# Patient Record
Sex: MALE | Race: WHITE | NOT HISPANIC OR LATINO | Employment: FULL TIME
[De-identification: names, ages, dates, MRNs, and addresses within clinical notes are randomized per-mention and may not be internally consistent; named-entity substitution may affect disease eponyms.]

---

## 2019-10-03 ENCOUNTER — HEALTH MAINTENANCE LETTER (OUTPATIENT)
Age: 49
End: 2019-10-03

## 2020-12-11 ENCOUNTER — APPOINTMENT (OUTPATIENT)
Dept: URBAN - METROPOLITAN AREA CLINIC 253 | Age: 50
Setting detail: DERMATOLOGY
End: 2020-12-12

## 2020-12-11 VITALS — RESPIRATION RATE: 14 BRPM | HEIGHT: 71 IN | WEIGHT: 315 LBS

## 2020-12-11 DIAGNOSIS — L70.0 ACNE VULGARIS: ICD-10-CM

## 2020-12-11 DIAGNOSIS — L98.8 OTHER SPECIFIED DISORDERS OF THE SKIN AND SUBCUTANEOUS TISSUE: ICD-10-CM

## 2020-12-11 DIAGNOSIS — L71.8 OTHER ROSACEA: ICD-10-CM

## 2020-12-11 DIAGNOSIS — L91.8 OTHER HYPERTROPHIC DISORDERS OF THE SKIN: ICD-10-CM

## 2020-12-11 PROCEDURE — OTHER COUNSELING: OTHER

## 2020-12-11 PROCEDURE — 99202 OFFICE O/P NEW SF 15 MIN: CPT

## 2020-12-11 PROCEDURE — OTHER PATIENT SPECIFIC COUNSELING: OTHER

## 2020-12-11 PROCEDURE — OTHER ADDITIONAL NOTES: OTHER

## 2020-12-11 PROCEDURE — OTHER PRESCRIPTION: OTHER

## 2020-12-11 RX ORDER — UREA 20 %
20% CREAM (GRAM) TOPICAL QD-BID
Qty: 1 | Refills: 3 | Status: ERX | COMMUNITY
Start: 2020-12-11

## 2020-12-11 RX ORDER — CLINDAMYCIN PHOSPHATE 10 MG/ML
1% LOTION TOPICAL BID
Qty: 1 | Refills: 3 | Status: ERX | COMMUNITY
Start: 2020-12-11

## 2020-12-11 ASSESSMENT — LOCATION DETAILED DESCRIPTION DERM
LOCATION DETAILED: STERNUM
LOCATION DETAILED: LEFT PROXIMAL PRETIBIAL REGION
LOCATION DETAILED: LEFT AXILLARY VAULT
LOCATION DETAILED: LEFT LATERAL PLANTAR HEEL
LOCATION DETAILED: RIGHT AXILLARY VAULT
LOCATION DETAILED: NASAL SUPRATIP
LOCATION DETAILED: RIGHT PROXIMAL PRETIBIAL REGION
LOCATION DETAILED: RIGHT MEDIAL PLANTAR HEEL

## 2020-12-11 ASSESSMENT — LOCATION SIMPLE DESCRIPTION DERM
LOCATION SIMPLE: RIGHT PRETIBIAL REGION
LOCATION SIMPLE: RIGHT PLANTAR SURFACE
LOCATION SIMPLE: CHEST
LOCATION SIMPLE: NOSE
LOCATION SIMPLE: LEFT AXILLARY VAULT
LOCATION SIMPLE: LEFT PRETIBIAL REGION
LOCATION SIMPLE: RIGHT AXILLARY VAULT
LOCATION SIMPLE: LEFT PLANTAR SURFACE

## 2020-12-11 ASSESSMENT — LOCATION ZONE DERM
LOCATION ZONE: LEG
LOCATION ZONE: AXILLAE
LOCATION ZONE: NOSE
LOCATION ZONE: TRUNK
LOCATION ZONE: FEET

## 2020-12-11 NOTE — PROCEDURE: COUNSELING
Doxycycline Counseling:  Patient counseled regarding possible photosensitivity and increased risk for sunburn.  Patient instructed to avoid sunlight, if possible.  When exposed to sunlight, patients should wear protective clothing, sunglasses, and sunscreen.  The patient was instructed to call the office immediately if the following severe adverse effects occur:  hearing changes, easy bruising/bleeding, severe headache, or vision changes.  The patient verbalized understanding of the proper use and possible adverse effects of doxycycline.  All of the patient's questions and concerns were addressed.
High Dose Vitamin A Pregnancy And Lactation Text: High dose vitamin A therapy is contraindicated during pregnancy and breast feeding.
Spironolactone Pregnancy And Lactation Text: This medication can cause feminization of the male fetus and should be avoided during pregnancy. The active metabolite is also found in breast milk.
Birth Control Pills Counseling: Birth Control Pill Counseling: I discussed with the patient the potential side effects of OCPs including but not limited to increased risk of stroke, heart attack, thrombophlebitis, deep venous thrombosis, hepatic adenomas, breast changes, GI upset, headaches, and depression.  The patient verbalized understanding of the proper use and possible adverse effects of OCPs. All of the patient's questions and concerns were addressed.
Tazorac Pregnancy And Lactation Text: This medication is not safe during pregnancy. It is unknown if this medication is excreted in breast milk.
Erythromycin Counseling:  I discussed with the patient the risks of erythromycin including but not limited to GI upset, allergic reaction, drug rash, diarrhea, increase in liver enzymes, and yeast infections.
Include Pregnancy/Lactation Warning?: No
Detail Level: Simple
Azithromycin Counseling:  I discussed with the patient the risks of azithromycin including but not limited to GI upset, allergic reaction, drug rash, diarrhea, and yeast infections.
High Dose Vitamin A Counseling: Side effects reviewed, pt to contact office should one occur.
Spironolactone Counseling: Patient advised regarding risks of diarrhea, abdominal pain, hyperkalemia, birth defects (for female patients), liver toxicity and renal toxicity. The patient may need blood work to monitor liver and kidney function and potassium levels while on therapy. The patient verbalized understanding of the proper use and possible adverse effects of spironolactone.  All of the patient's questions and concerns were addressed.
Tazorac Counseling:  Patient advised that medication is irritating and drying.  Patient may need to apply sparingly and wash off after an hour before eventually leaving it on overnight.  The patient verbalized understanding of the proper use and possible adverse effects of tazorac.  All of the patient's questions and concerns were addressed.
Dapsone Pregnancy And Lactation Text: This medication is Pregnancy Category C and is not considered safe during pregnancy or breast feeding.
Topical Clindamycin Pregnancy And Lactation Text: This medication is Pregnancy Category B and is considered safe during pregnancy. It is unknown if it is excreted in breast milk.
Sarecycline Counseling: Patient advised regarding possible photosensitivity and discoloration of the teeth, skin, lips, tongue and gums.  Patient instructed to avoid sunlight, if possible.  When exposed to sunlight, patients should wear protective clothing, sunglasses, and sunscreen.  The patient was instructed to call the office immediately if the following severe adverse effects occur:  hearing changes, easy bruising/bleeding, severe headache, or vision changes.  The patient verbalized understanding of the proper use and possible adverse effects of sarecycline.  All of the patient's questions and concerns were addressed.
Bactrim Pregnancy And Lactation Text: This medication is Pregnancy Category D and is known to cause fetal risk.  It is also excreted in breast milk.
Erythromycin Pregnancy And Lactation Text: This medication is Pregnancy Category B and is considered safe during pregnancy. It is also excreted in breast milk.
Benzoyl Peroxide Counseling: Patient counseled that medicine may cause skin irritation and bleach clothing.  In the event of skin irritation, the patient was advised to reduce the amount of the drug applied or use it less frequently.   The patient verbalized understanding of the proper use and possible adverse effects of benzoyl peroxide.  All of the patient's questions and concerns were addressed.
Isotretinoin Counseling: Patient should get monthly blood tests, not donate blood, not drive at night if vision affected, not share medication, and not undergo elective surgery for 6 months after tx completed. Side effects reviewed, pt to contact office should one occur.
Topical Retinoid Pregnancy And Lactation Text: This medication is Pregnancy Category C. It is unknown if this medication is excreted in breast milk.
Birth Control Pills Pregnancy And Lactation Text: This medication should be avoided if pregnant and for the first 30 days post-partum.
Minocycline Pregnancy And Lactation Text: This medication is Pregnancy Category D and not consider safe during pregnancy. It is also excreted in breast milk.
Bactrim Counseling:  I discussed with the patient the risks of sulfa antibiotics including but not limited to GI upset, allergic reaction, drug rash, diarrhea, dizziness, photosensitivity, and yeast infections.  Rarely, more serious reactions can occur including but not limited to aplastic anemia, agranulocytosis, methemoglobinemia, blood dyscrasias, liver or kidney failure, lung infiltrates or desquamative/blistering drug rashes.
Dapsone Counseling: I discussed with the patient the risks of dapsone including but not limited to hemolytic anemia, agranulocytosis, rashes, methemoglobinemia, kidney failure, peripheral neuropathy, headaches, GI upset, and liver toxicity.  Patients who start dapsone require monitoring including baseline LFTs and weekly CBCs for the first month, then every month thereafter.  The patient verbalized understanding of the proper use and possible adverse effects of dapsone.  All of the patient's questions and concerns were addressed.
Topical Retinoid counseling:  Patient advised to apply a pea-sized amount only at bedtime and wait 30 minutes after washing their face before applying.  If too drying, patient may add a non-comedogenic moisturizer. The patient verbalized understanding of the proper use and possible adverse effects of retinoids.  All of the patient's questions and concerns were addressed.
Minocycline Counseling: Patient advised regarding possible photosensitivity and discoloration of the teeth, skin, lips, tongue and gums.  Patient instructed to avoid sunlight, if possible.  When exposed to sunlight, patients should wear protective clothing, sunglasses, and sunscreen.  The patient was instructed to call the office immediately if the following severe adverse effects occur:  hearing changes, easy bruising/bleeding, severe headache, or vision changes.  The patient verbalized understanding of the proper use and possible adverse effects of minocycline.  All of the patient's questions and concerns were addressed.
Isotretinoin Pregnancy And Lactation Text: This medication is Pregnancy Category X and is considered extremely dangerous during pregnancy. It is unknown if it is excreted in breast milk.
Benzoyl Peroxide Pregnancy And Lactation Text: This medication is Pregnancy Category C. It is unknown if benzoyl peroxide is excreted in breast milk.
Topical Sulfur Applications Counseling: Topical Sulfur Counseling: Patient counseled that this medication may cause skin irritation or allergic reactions.  In the event of skin irritation, the patient was advised to reduce the amount of the drug applied or use it less frequently.   The patient verbalized understanding of the proper use and possible adverse effects of topical sulfur application.  All of the patient's questions and concerns were addressed.
Topical Sulfur Applications Pregnancy And Lactation Text: This medication is Pregnancy Category C and has an unknown safety profile during pregnancy. It is unknown if this topical medication is excreted in breast milk.
Tetracycline Counseling: Patient counseled regarding possible photosensitivity and increased risk for sunburn.  Patient instructed to avoid sunlight, if possible.  When exposed to sunlight, patients should wear protective clothing, sunglasses, and sunscreen.  The patient was instructed to call the office immediately if the following severe adverse effects occur:  hearing changes, easy bruising/bleeding, severe headache, or vision changes.  The patient verbalized understanding of the proper use and possible adverse effects of tetracycline.  All of the patient's questions and concerns were addressed. Patient understands to avoid pregnancy while on therapy due to potential birth defects.
Doxycycline Pregnancy And Lactation Text: This medication is Pregnancy Category D and not consider safe during pregnancy. It is also excreted in breast milk but is considered safe for shorter treatment courses.
Azithromycin Pregnancy And Lactation Text: This medication is considered safe during pregnancy and is also secreted in breast milk.
Detail Level: Zone
Topical Clindamycin Counseling: Patient counseled that this medication may cause skin irritation or allergic reactions.  In the event of skin irritation, the patient was advised to reduce the amount of the drug applied or use it less frequently.   The patient verbalized understanding of the proper use and possible adverse effects of clindamycin.  All of the patient's questions and concerns were addressed.

## 2020-12-11 NOTE — HPI: RASH
What Type Of Note Output Would You Prefer (Optional)?: Standard Output
Is The Patient Presenting As Previously Scheduled?: Yes
How Severe Is Your Rash?: severe
Is This A New Presentation, Or A Follow-Up?: Rash
Additional History: He has tried clotrimazole- betamethasone. He also tried Vaseline. He usually goes barefoot.
How Severe Is Your Rash?: moderate

## 2020-12-11 NOTE — PROCEDURE: ADDITIONAL NOTES
Additional Notes: Recommend washing with an OTC BPO 4% wash
Detail Level: Detailed
Additional Notes: Discussed removal would be covered by insurance if irritated or bothersome. If not irritated, discussed cosmetic removal of up to 14 for $100.

## 2020-12-11 NOTE — PROCEDURE: PATIENT SPECIFIC COUNSELING
Sending over prescription of urea cream for patient to use on thickened areas once to twice daily. Also recommended Eucerin urea cream and provided patient with coupon. If prescription urea cream is too strong, recommended alternating between the two
Detail Level: Simple

## 2021-01-15 ENCOUNTER — HEALTH MAINTENANCE LETTER (OUTPATIENT)
Age: 51
End: 2021-01-15

## 2021-06-28 ENCOUNTER — APPOINTMENT (OUTPATIENT)
Dept: URBAN - METROPOLITAN AREA CLINIC 253 | Age: 51
Setting detail: DERMATOLOGY
End: 2021-06-30

## 2021-06-28 VITALS — WEIGHT: 312 LBS | RESPIRATION RATE: 14 BRPM | HEIGHT: 70 IN

## 2021-06-28 DIAGNOSIS — R21 RASH AND OTHER NONSPECIFIC SKIN ERUPTION: ICD-10-CM

## 2021-06-28 DIAGNOSIS — L60.1 ONYCHOLYSIS: ICD-10-CM

## 2021-06-28 PROCEDURE — OTHER ADDITIONAL NOTES: OTHER

## 2021-06-28 PROCEDURE — OTHER COUNSELING: OTHER

## 2021-06-28 PROCEDURE — OTHER PRESCRIPTION: OTHER

## 2021-06-28 PROCEDURE — 99213 OFFICE O/P EST LOW 20 MIN: CPT

## 2021-06-28 RX ORDER — TRIAMCINOLONE ACETONIDE 1 MG/G
0.1% CREAM TOPICAL BID
Qty: 1 | Refills: 1 | Status: ERX | COMMUNITY
Start: 2021-06-28

## 2021-06-28 ASSESSMENT — LOCATION DETAILED DESCRIPTION DERM
LOCATION DETAILED: SUPERIOR THORACIC SPINE
LOCATION DETAILED: LEFT 2ND TOENAIL

## 2021-06-28 ASSESSMENT — LOCATION SIMPLE DESCRIPTION DERM
LOCATION SIMPLE: UPPER BACK
LOCATION SIMPLE: LEFT 2ND TOE

## 2021-06-28 ASSESSMENT — LOCATION ZONE DERM
LOCATION ZONE: TOENAIL
LOCATION ZONE: TRUNK

## 2021-06-28 NOTE — HPI: NAIL DYSTROPHY
How Severe Is It?: moderate
Is This A New Presentation, Or A Follow-Up?: Nail Dystrophy
Additional History: He has had his foot stepped on when he played football back from 6th grade through high school and one year in college. \\nIt has been consistent with what it looked like since it was originally damaged.

## 2021-06-28 NOTE — PROCEDURE: ADDITIONAL NOTES
Render Risk Assessment In Note?: no
Additional Notes: Recommended trialing Zyrtec daily for 1 month and see if any improvement. Also recommended he take a photo next time it flares\\nDiscussed following up with PCP if not improved to discuss trial off of topamax
Detail Level: Detailed
Additional Notes: Photo taken. Recommended keeping nail soft with oil or Vaseline.

## 2021-06-28 NOTE — HPI: RASH
What Type Of Note Output Would You Prefer (Optional)?: Standard Output
Is The Patient Presenting As Previously Scheduled?: Yes
How Severe Is Your Rash?: moderate
Is This A New Presentation, Or A Follow-Up?: Rash
Additional History: He doesn’t remember this happening years duncan. The only medication that is different -topomax.  He has been on Topomax since November.

## 2021-08-30 ENCOUNTER — APPOINTMENT (OUTPATIENT)
Dept: URBAN - METROPOLITAN AREA CLINIC 253 | Age: 51
Setting detail: DERMATOLOGY
End: 2021-08-30

## 2021-08-30 DIAGNOSIS — R20.2 PARESTHESIA OF SKIN: ICD-10-CM

## 2021-08-30 PROCEDURE — 99212 OFFICE O/P EST SF 10 MIN: CPT

## 2021-08-30 PROCEDURE — OTHER COUNSELING: OTHER

## 2021-08-30 ASSESSMENT — LOCATION SIMPLE DESCRIPTION DERM
LOCATION SIMPLE: RIGHT UPPER BACK
LOCATION SIMPLE: UPPER BACK

## 2021-08-30 ASSESSMENT — LOCATION ZONE DERM: LOCATION ZONE: TRUNK

## 2021-08-30 ASSESSMENT — LOCATION DETAILED DESCRIPTION DERM
LOCATION DETAILED: SUPERIOR THORACIC SPINE
LOCATION DETAILED: RIGHT SUPERIOR MEDIAL UPPER BACK

## 2021-08-30 NOTE — PROCEDURE: COUNSELING
Patient Specific Counseling (Will Not Stick From Patient To Patient): He states that it is itchy even when he is doing dishes and painting. He has been taking a daily Benadryl with little improvement. \\nHe states that it start in the upper back and extends down to his hip and up to his neck. Reassured him that there is no active rash on exam. \\nRecommended stretching and massaging of the muscles.\\nDiscussed seeing an orthopedic or chiropractor. \\nRecommended that he continue with the [facility] cream for pruritic areas and icing the active itchy areas. \\nRecommended he discontinue the Benadryl as he is not seeing any relief with it.
Detail Level: Zone

## 2021-09-05 ENCOUNTER — HEALTH MAINTENANCE LETTER (OUTPATIENT)
Age: 51
End: 2021-09-05

## 2021-10-22 ENCOUNTER — THERAPY VISIT (OUTPATIENT)
Dept: PHYSICAL THERAPY | Facility: CLINIC | Age: 51
End: 2021-10-22
Payer: COMMERCIAL

## 2021-10-22 DIAGNOSIS — M50.30 DDD (DEGENERATIVE DISC DISEASE), CERVICAL: ICD-10-CM

## 2021-10-22 DIAGNOSIS — M54.2 NECK PAIN: ICD-10-CM

## 2021-10-22 PROCEDURE — 97140 MANUAL THERAPY 1/> REGIONS: CPT | Mod: GP | Performed by: PHYSICAL THERAPIST

## 2021-10-22 PROCEDURE — 97161 PT EVAL LOW COMPLEX 20 MIN: CPT | Mod: GP | Performed by: PHYSICAL THERAPIST

## 2021-10-22 PROCEDURE — 97110 THERAPEUTIC EXERCISES: CPT | Mod: GP | Performed by: PHYSICAL THERAPIST

## 2021-10-22 NOTE — LETTER
[facility]  [address]  [facility] [address]  [phone]    2021    Re: Sarath Mckeon   :   1970  MRN:  [id number]   REFERRING PHYSICIAN:   Sarath MCKINNON [facility]  Date of Initial Evaluation:  10/22/21  Visits:  Rxs Used: 1  Reason for Referral:  Neck pain; DDD (degenerative disc disease), cervical    EVALUATION SUMMARY    Physical Therapy Initial Evaluation  Subjective:  The history is provided by the patient. No  was used.   Patient Health History  Sarath Mckeon being seen for Neck pain, C5-6-7, notalgia paresthetica.     Problem began: 10/11/2021.   Problem occurred: unknown   Pain is reported as 5/10 on pain scale.  General health as reported by patient is good.  Pertinent medical history includes: concussions/dizziness, depression, heart problems, history of fractures, mental illness, numbness/tingling, overweight, sleep disorder/apnea, smoking, thyroid problems and other. Other medical history details: benign  bone tumor, ACL surgery.   Surgeries include:  Orthopedic surgery and cancer surgery. Other surgery history details: bone tumor, benign, ACL.    Current medications:  Anti-depressants.    Current occupation  software.   Primary job tasks include:  Computer work and prolonged sitting.              Therapist Generated HPI Evaluation  Problem details: Patient reports a long history of neck pain with x rays done in 2013 for right neck pain which showed degenerative changes at C5-6. He had some PT at [facility] at that time and has had some chiropractic treatment over the years with manipulation, most recently 6 months ago. He has been working from home since COVID on a laptop which he has to the left of his personal computer.  He notes an increase in bilateral neck pain in the spring.  Pain starts after being up for  1 hour and continues through the day. He has occipital headaches 2 days per week starting the in afternoon and they tend to be more on work days. A MRI on 21 showed moderate DDD at C5-6 with disc osteophyte complex and uncinate spurring greater on the right and facet arthropathy bilaterally with moderate to moderately severe neural foraminal stenosis R > L.  Shallow central disc protrusion C6-7 with uncinate spurring L > R. Chief complaints are of bilateral neck pain and stiffness without radiation into his arm.  He has itching of the skin on his neck, known as notalgia paresthetica and occipital headaches twice per week. He sleeps on his back with 1 pillow under his head and is painfree with sleeping. He walks 1 mile per day for exercise. He uses heat, ice and Tylenol/Advil without relief..         Type of problem:  Cervical spine.  This is a chronic condition.  Condition occurred with:  Degenerative joint disease.  Where condition occurred: for unknown reasons.  Patient reports pain:  Cervical left side and cervical right side.  Re: Sarath Mckeon   :   1970          Pain is described as aching and sharp and is intermittent.  Pain radiates to:  Head. Pain is worse during the day.  Since onset symptoms are gradually worsening.  Associated symptoms:  Loss of motion/stiffness and headache (he has vertigo). Symptoms are exacerbated by driving, looking up or down, rotating head, certain positions and sitting    Previous treatment includes chiropractic and physical therapy. There was mild improvement following previous treatment.  Restrictions due to condition include:  Working in normal job without restrictions.                 Objective:  Standing Alignment:    Cervical/Thoracic:  Forward head  Shoulder/UE:  Rounded shoulders, protracted scapula L, protracted scapula R, elevated scapula L and elevated scapula R     Cervical/Thoracic Evaluation  AROM:  AROM Cervical:  Flexion:            100% with  increased pain  Extension:       90% with decreased pain  Rotation:         Left: 90% increased L neck pain     Right: 100% with decreased pain  Side Bend:      Left: 50% with right neck stretch     Right:  40% with left neck  Strength: Prone shoulder extension grade 4+/5 B  Headaches: cervical  Cervical Myotomes:  normal  DTR's:  not assessed  Cervical Dermatomes:  normal  Cervical Palpation:  : Moderate L > R.  Tenderness present at Left:    Upper Trap; Levator; Erector Spinae and Suboccipitals  Tenderness present at Right:    Upper Trap; Levator; Erector Spinae and Suboccipitals  Cervical Stability/Joint Clearing:  normal  Cord Sign:  normal      Assessment/Plan:    Patient is a 51 year old male with cervical complaints.    Patient has the following significant findings with corresponding treatment plan.                Diagnosis 1:  Cervical pain and DDD  Pain -  mechanical traction, manual therapy, education and home program  Decreased ROM/flexibility - manual therapy and therapeutic exercise  Decreased strength - therapeutic exercise and therapeutic activities  Impaired muscle performance - neuro re-education  Decreased function - therapeutic activities  Impaired posture - neuro re-education    Therapy Evaluation Codes:   1) History comprised of:   Personal factors that impact the plan of care:      None.        Re: Sarath Mckeon   :   1970          2) Examination of Body Systems comprised of:   Body structures and functions that impact the plan of care:      Cervical spine.   Activity limitations that impact the plan of care are:      Driving, Lifting, Reading/Computer work, Sitting and Working.  3) Clinical presentation characteristics are:   Stable/Uncomplicated.  4) Decision-Making    Low complexity using standardized patient assessment instrument and/or measureable assessment of functional outcome.    Cumulative Therapy Evaluation is: Low complexity.    Previous and current functional  limitations:  (See Goal Flow Sheet for this information)    Short term and Long term goals: (See Goal Flow Sheet for this information)     Communication ability:  Patient appears to be able to clearly communicate and understand verbal and written communication and follow directions correctly.  Treatment Explanation - The following has been discussed with the patient:   RX ordered/plan of care  Anticipated outcomes  Possible risks and side effects  This patient would benefit from PT intervention to resume normal activities.   Rehab potential is good.    Frequency:  1 X week, once daily  Duration:  for 8 weeks  Discharge Plan:  Achieve all LTG.  Independent in home treatment program.  Reach maximal therapeutic benefit.    Thank you for your referral.    INQUIRIES  Therapist: Francisca Roy PT   [address] [address]  Phone: [phone]  Fax: [phone]

## 2021-10-22 NOTE — PROGRESS NOTES
Physical Therapy Initial Evaluation  Subjective:  The history is provided by the patient. No  was used.   Patient Health History  Sarath Mckeon being seen for Neck pain, C5-6-7, notalgia paresthetica.     Problem began: 10/11/2021.   Problem occurred: unknown   Pain is reported as 5/10 on pain scale.  General health as reported by patient is good.  Pertinent medical history includes: concussions/dizziness, depression, heart problems, history of fractures, mental illness, numbness/tingling, overweight, sleep disorder/apnea, smoking, thyroid problems and other. Other medical history details: benign  bone tumor, ACL surgery.        Surgeries include:  Orthopedic surgery and cancer surgery. Other surgery history details: bone tumor, benign, ACL.    Current medications:  Anti-depressants.    Current occupation  software.   Primary job tasks include:  Computer work and prolonged sitting.                  Therapist Generated HPI Evaluation  Problem details: Patient reports a long history of neck pain with x rays done in February 2013 for right neck pain which showed degenerative changes at C5-6. He had some PT at [facility] at that time and has had some chiropractic treatment over the years with manipulation, most recently 6 months ago. He has been working from home since COVID on a laptop which he has to the left of his personal computer.  He notes an increase in bilateral neck pain in the spring.  Pain starts after being up for 1 hour and continues through the day. He has occipital headaches 2 days per week starting the in afternoon and they tend to be more on work days. A MRI on 9/13/21 showed moderate DDD at C5-6 with disc osteophyte complex and uncinate spurring greater on the right and facet arthropathy bilaterally with moderate to moderately severe neural foraminal stenosis R > L.  Shallow central disc protrusion C6-7 with uncinate spurring L > R. Chief complaints are of  bilateral neck pain and stiffness without radiation into his arm.  He has itching of the skin on his neck, known as notalgia paresthetica and occipital headaches twice per week. He sleeps on his back with 1 pillow under his head and is painfree with sleeping. He walks 1 mile per day for exercise. He uses heat, ice and Tylenol/Advil without relief..         Type of problem:  Cervical spine.    This is a chronic condition.  Condition occurred with:  Degenerative joint disease.  Where condition occurred: for unknown reasons.  Patient reports pain:  Cervical left side and cervical right side.  Pain is described as aching and sharp and is intermittent.  Pain radiates to:  Head. Pain is worse during the day.  Since onset symptoms are gradually worsening.  Associated symptoms:  Loss of motion/stiffness and headache (he has vertigo). Symptoms are exacerbated by driving, looking up or down, rotating head, certain positions and sitting      Previous treatment includes chiropractic and physical therapy. There was mild improvement following previous treatment.  Restrictions due to condition include:  Working in normal job without restrictions.                          Objective:  Standing Alignment:    Cervical/Thoracic:  Forward head  Shoulder/UE:  Rounded shoulders, protracted scapula L, protracted scapula R, elevated scapula L and elevated scapula R                                  Cervical/Thoracic Evaluation    AROM:  AROM Cervical:    Flexion:            100% with increased pain  Extension:       90% with decreased pain  Rotation:         Left: 90% increased L neck pain     Right: 100% with decreased pain  Side Bend:      Left: 50% with right neck stretch     Right:  40% with left neck    Strength: Prone shoulder extension grade 4+/5 B  Headaches: cervical  Cervical Myotomes:  normal                  DTR's:  not assessed          Cervical Dermatomes:  normal                    Cervical Palpation:  : Moderate L >  R.  Tenderness present at Left:    Upper Trap; Levator; Erector Spinae and Suboccipitals  Tenderness present at Right:    Upper Trap; Levator; Erector Spinae and Suboccipitals    Cervical Stability/Joint Clearing:  normal        Cord Sign:  normal                                            General     ROS    Assessment/Plan:    Patient is a 51 year old male with cervical complaints.    Patient has the following significant findings with corresponding treatment plan.                Diagnosis 1:  Cervical pain and DDD  Pain -  mechanical traction, manual therapy, education and home program  Decreased ROM/flexibility - manual therapy and therapeutic exercise  Decreased strength - therapeutic exercise and therapeutic activities  Impaired muscle performance - neuro re-education  Decreased function - therapeutic activities  Impaired posture - neuro re-education    Therapy Evaluation Codes:   1) History comprised of:   Personal factors that impact the plan of care:      None.   2)  Examination of Body Systems comprised of:   Body structures and functions that impact the plan of care:      Cervical spine.   Activity limitations that impact the plan of care are:      Driving, Lifting, Reading/Computer work, Sitting and Working.  3) Clinical presentation characteristics are:   Stable/Uncomplicated.  4) Decision-Making    Low complexity using standardized patient assessment instrument and/or measureable assessment of functional outcome.  Cumulative Therapy Evaluation is: Low complexity.    Previous and current functional limitations:  (See Goal Flow Sheet for this information)    Short term and Long term goals: (See Goal Flow Sheet for this information)     Communication ability:  Patient appears to be able to clearly communicate and understand verbal and written communication and follow directions correctly.  Treatment Explanation - The following has been discussed with the patient:   RX ordered/plan of care  Anticipated  outcomes  Possible risks and side effects  This patient would benefit from PT intervention to resume normal activities.   Rehab potential is good.    Frequency:  1 X week, once daily  Duration:  for 8 weeks  Discharge Plan:  Achieve all LTG.  Independent in home treatment program.  Reach maximal therapeutic benefit.    Please refer to the daily flowsheet for treatment today, total treatment time and time spent performing 1:1 timed codes.

## 2021-11-05 ENCOUNTER — THERAPY VISIT (OUTPATIENT)
Dept: PHYSICAL THERAPY | Facility: CLINIC | Age: 51
End: 2021-11-05
Payer: COMMERCIAL

## 2021-11-05 DIAGNOSIS — M50.30 DDD (DEGENERATIVE DISC DISEASE), CERVICAL: ICD-10-CM

## 2021-11-05 DIAGNOSIS — M54.2 NECK PAIN: ICD-10-CM

## 2021-11-05 PROCEDURE — 97110 THERAPEUTIC EXERCISES: CPT | Mod: GP | Performed by: PHYSICAL THERAPIST

## 2021-11-05 PROCEDURE — 97112 NEUROMUSCULAR REEDUCATION: CPT | Mod: GP | Performed by: PHYSICAL THERAPIST

## 2021-11-05 PROCEDURE — 97140 MANUAL THERAPY 1/> REGIONS: CPT | Mod: GP | Performed by: PHYSICAL THERAPIST

## 2021-12-20 NOTE — PROGRESS NOTES
Discharge Note    Progress reporting period is from initial eval to Nov 5, 2021.     Sarath failed to return for next follow up visit and current status is unknown.  Please see information below for last relevant information on current status.  Patient seen for Rxs Used: 2 visits.  SUBJECTIVE  Subjective changes noted by patient:  Subjective: Doing better, less L sharp pain.  Good otto to HEP  .  Current pain level is Current Pain level: 3/10.     Previous pain level was  Initial Pain level: 5/10.   Changes in function:  Yes (See Goal flowsheet attached for changes in current functional level)  Adverse reaction to treatment or activity: None    OBJECTIVE  Changes noted in objective findings: Objective: seated retraction: ERP during, decreased pain after, mod cuing with seated scap retraction to decrease UT recruitment     ASSESSMENT/PLAN  Diagnosis: Chronic neck pain/DDD   DIAGP:  Diagnoses of Neck pain and DDD (degenerative disc disease), cervical were pertinent to this visit.  Updated problem list and treatment plan:   Pain - HEP  Decreased ROM/flexibility - HEP  Decreased function - HEP  Decreased strength - HEP  Impaired muscle performance - HEP  Impaired posture - HEP  STG/LTGs have been met or progress has been made towards goals:  Yes, please see goal flowsheet for most current information  Assessment of Progress: current status is unknown.  Last current status: Assessment of progress: Pt is progressing as expected   Self Management Plans:  HEP  I have re-evaluated this patient and find that the nature, scope, duration and intensity of the therapy is appropriate for the medical condition of the patient.  Sarath continues to require the following intervention to meet STG and LTG's:  HEP.    Recommendations:  Discharge with current home program.  Patient to follow up with MD as needed.    Please refer to the daily flowsheet for treatment today, total treatment time and time spent performing 1:1 timed  codes.

## 2022-02-20 ENCOUNTER — HEALTH MAINTENANCE LETTER (OUTPATIENT)
Age: 52
End: 2022-02-20

## 2022-04-27 ENCOUNTER — HOSPITAL ENCOUNTER (OUTPATIENT)
Facility: CLINIC | Age: 52
Setting detail: OBSERVATION
Discharge: PSYCHIATRIC OR SUBSTANCE USE FACILITY | End: 2022-04-28
Attending: EMERGENCY MEDICINE | Admitting: STUDENT IN AN ORGANIZED HEALTH CARE EDUCATION/TRAINING PROGRAM
Payer: COMMERCIAL

## 2022-04-27 DIAGNOSIS — R45.851 SUICIDAL IDEATION: ICD-10-CM

## 2022-04-27 DIAGNOSIS — F31.81 BIPOLAR II DISORDER, SEVERE, DEPRESSED, WITH ANXIOUS DISTRESS (H): ICD-10-CM

## 2022-04-27 LAB — SARS-COV-2 RNA RESP QL NAA+PROBE: NEGATIVE

## 2022-04-27 PROCEDURE — U0005 INFEC AGEN DETEC AMPLI PROBE: HCPCS | Performed by: EMERGENCY MEDICINE

## 2022-04-27 PROCEDURE — 99285 EMERGENCY DEPT VISIT HI MDM: CPT | Mod: 25

## 2022-04-27 PROCEDURE — 90791 PSYCH DIAGNOSTIC EVALUATION: CPT

## 2022-04-27 PROCEDURE — C9803 HOPD COVID-19 SPEC COLLECT: HCPCS

## 2022-04-27 RX ORDER — RISPERIDONE 2 MG/1
2 TABLET ORAL AT BEDTIME
Status: ON HOLD | COMMUNITY
End: 2022-05-03

## 2022-04-27 RX ORDER — TOPIRAMATE 100 MG/1
200 TABLET, FILM COATED ORAL 2 TIMES DAILY
COMMUNITY

## 2022-04-27 RX ORDER — LORAZEPAM 1 MG/1
1 TABLET ORAL 2 TIMES DAILY PRN
COMMUNITY

## 2022-04-27 RX ORDER — NALTREXONE HYDROCHLORIDE 50 MG/1
50 TABLET, FILM COATED ORAL 2 TIMES DAILY
COMMUNITY
End: 2023-09-08

## 2022-04-27 RX ORDER — LAMOTRIGINE 200 MG/1
200 TABLET ORAL DAILY
COMMUNITY

## 2022-04-27 RX ORDER — BUPROPION HYDROCHLORIDE 150 MG/1
150 TABLET ORAL EVERY MORNING
COMMUNITY
End: 2023-09-08

## 2022-04-27 RX ORDER — SERTRALINE HYDROCHLORIDE 100 MG/1
200 TABLET, FILM COATED ORAL DAILY
COMMUNITY

## 2022-04-28 ENCOUNTER — TELEPHONE (OUTPATIENT)
Dept: BEHAVIORAL HEALTH | Facility: CLINIC | Age: 52
End: 2022-04-28
Payer: COMMERCIAL

## 2022-04-28 ENCOUNTER — HOSPITAL ENCOUNTER (INPATIENT)
Facility: CLINIC | Age: 52
LOS: 5 days | Discharge: HOME | DRG: 885 | End: 2022-05-03
Attending: PSYCHIATRY & NEUROLOGY | Admitting: PSYCHIATRY & NEUROLOGY
Payer: COMMERCIAL

## 2022-04-28 VITALS
HEART RATE: 79 BPM | SYSTOLIC BLOOD PRESSURE: 160 MMHG | TEMPERATURE: 99.3 F | OXYGEN SATURATION: 97 % | DIASTOLIC BLOOD PRESSURE: 70 MMHG | RESPIRATION RATE: 16 BRPM | BODY MASS INDEX: 45.1 KG/M2 | WEIGHT: 315 LBS | HEIGHT: 70 IN

## 2022-04-28 DIAGNOSIS — F31.81 BIPOLAR II DISORDER (H): ICD-10-CM

## 2022-04-28 DIAGNOSIS — R45.851 SUICIDAL IDEATION: Primary | ICD-10-CM

## 2022-04-28 PROCEDURE — 999N000157 HC STATISTIC RCP TIME EA 10 MIN

## 2022-04-28 PROCEDURE — 94660 CPAP INITIATION&MGMT: CPT

## 2022-04-28 PROCEDURE — 250N000013 HC RX MED GY IP 250 OP 250 PS 637: Performed by: PSYCHIATRY & NEUROLOGY

## 2022-04-28 PROCEDURE — 124N000002 HC R&B MH UMMC

## 2022-04-28 PROCEDURE — 250N000013 HC RX MED GY IP 250 OP 250 PS 637: Performed by: STUDENT IN AN ORGANIZED HEALTH CARE EDUCATION/TRAINING PROGRAM

## 2022-04-28 PROCEDURE — 99236 HOSP IP/OBS SAME DATE HI 85: CPT | Performed by: PSYCHIATRY & NEUROLOGY

## 2022-04-28 PROCEDURE — G0378 HOSPITAL OBSERVATION PER HR: HCPCS

## 2022-04-28 RX ORDER — TOPIRAMATE 50 MG/1
200 TABLET, FILM COATED ORAL 2 TIMES DAILY
Status: DISCONTINUED | OUTPATIENT
Start: 2022-04-28 | End: 2022-05-03 | Stop reason: HOSPADM

## 2022-04-28 RX ORDER — LORAZEPAM 1 MG/1
1 TABLET ORAL EVERY 6 HOURS PRN
Status: DISCONTINUED | OUTPATIENT
Start: 2022-04-28 | End: 2022-04-28 | Stop reason: HOSPADM

## 2022-04-28 RX ORDER — LAMOTRIGINE 100 MG/1
200 TABLET ORAL DAILY
Status: DISCONTINUED | OUTPATIENT
Start: 2022-04-28 | End: 2022-04-28 | Stop reason: HOSPADM

## 2022-04-28 RX ORDER — MAGNESIUM HYDROXIDE/ALUMINUM HYDROXICE/SIMETHICONE 120; 1200; 1200 MG/30ML; MG/30ML; MG/30ML
30 SUSPENSION ORAL EVERY 4 HOURS PRN
Status: DISCONTINUED | OUTPATIENT
Start: 2022-04-28 | End: 2022-05-03 | Stop reason: HOSPADM

## 2022-04-28 RX ORDER — TOPIRAMATE 100 MG/1
200 TABLET, FILM COATED ORAL 2 TIMES DAILY
Status: DISCONTINUED | OUTPATIENT
Start: 2022-04-28 | End: 2022-04-28

## 2022-04-28 RX ORDER — RISPERIDONE 2 MG/1
2 TABLET ORAL AT BEDTIME
Status: DISCONTINUED | OUTPATIENT
Start: 2022-04-28 | End: 2022-04-29

## 2022-04-28 RX ORDER — LAMOTRIGINE 200 MG/1
200 TABLET ORAL DAILY
Status: DISCONTINUED | OUTPATIENT
Start: 2022-04-29 | End: 2022-05-03 | Stop reason: HOSPADM

## 2022-04-28 RX ORDER — BUPROPION HYDROCHLORIDE 150 MG/1
150 TABLET ORAL EVERY MORNING
Status: DISCONTINUED | OUTPATIENT
Start: 2022-04-28 | End: 2022-04-28 | Stop reason: HOSPADM

## 2022-04-28 RX ORDER — ACETAMINOPHEN 500 MG
1000 TABLET ORAL EVERY 6 HOURS PRN
Status: DISCONTINUED | OUTPATIENT
Start: 2022-04-28 | End: 2022-04-28 | Stop reason: HOSPADM

## 2022-04-28 RX ORDER — BUPROPION HYDROCHLORIDE 150 MG/1
150 TABLET ORAL EVERY MORNING
Status: DISCONTINUED | OUTPATIENT
Start: 2022-04-29 | End: 2022-05-03 | Stop reason: HOSPADM

## 2022-04-28 RX ORDER — OLANZAPINE 10 MG/2ML
10 INJECTION, POWDER, FOR SOLUTION INTRAMUSCULAR 3 TIMES DAILY PRN
Status: DISCONTINUED | OUTPATIENT
Start: 2022-04-28 | End: 2022-04-30

## 2022-04-28 RX ORDER — TOPIRAMATE 100 MG/1
200 TABLET, FILM COATED ORAL AT BEDTIME
Status: DISCONTINUED | OUTPATIENT
Start: 2022-04-28 | End: 2022-04-28

## 2022-04-28 RX ORDER — ACETAMINOPHEN 500 MG
500 TABLET ORAL EVERY 6 HOURS PRN
Status: DISCONTINUED | OUTPATIENT
Start: 2022-04-28 | End: 2022-04-28

## 2022-04-28 RX ORDER — LEVOTHYROXINE SODIUM 75 UG/1
150 TABLET ORAL
Status: DISCONTINUED | OUTPATIENT
Start: 2022-04-28 | End: 2022-04-28 | Stop reason: HOSPADM

## 2022-04-28 RX ORDER — BUSPIRONE HYDROCHLORIDE 10 MG/1
30 TABLET ORAL 2 TIMES DAILY
Status: DISCONTINUED | OUTPATIENT
Start: 2022-04-28 | End: 2022-04-28 | Stop reason: HOSPADM

## 2022-04-28 RX ORDER — TOPIRAMATE 100 MG/1
200 TABLET, FILM COATED ORAL 2 TIMES DAILY
Status: DISCONTINUED | OUTPATIENT
Start: 2022-04-28 | End: 2022-04-28 | Stop reason: HOSPADM

## 2022-04-28 RX ORDER — HYDROXYZINE HYDROCHLORIDE 50 MG/1
50 TABLET, FILM COATED ORAL 3 TIMES DAILY PRN
Status: DISCONTINUED | OUTPATIENT
Start: 2022-04-28 | End: 2022-04-28 | Stop reason: HOSPADM

## 2022-04-28 RX ORDER — SERTRALINE HYDROCHLORIDE 100 MG/1
200 TABLET, FILM COATED ORAL DAILY
Status: DISCONTINUED | OUTPATIENT
Start: 2022-04-28 | End: 2022-04-28 | Stop reason: HOSPADM

## 2022-04-28 RX ORDER — NALTREXONE HYDROCHLORIDE 50 MG/1
50 TABLET, FILM COATED ORAL DAILY
Status: DISCONTINUED | OUTPATIENT
Start: 2022-04-28 | End: 2022-04-28

## 2022-04-28 RX ORDER — RISPERIDONE 2 MG/1
2 TABLET ORAL DAILY
Status: DISCONTINUED | OUTPATIENT
Start: 2022-04-28 | End: 2022-04-28

## 2022-04-28 RX ORDER — BUSPIRONE HYDROCHLORIDE 10 MG/1
30 TABLET ORAL 2 TIMES DAILY
Status: DISCONTINUED | OUTPATIENT
Start: 2022-04-28 | End: 2022-04-28

## 2022-04-28 RX ORDER — HYDROXYZINE HYDROCHLORIDE 25 MG/1
25 TABLET, FILM COATED ORAL EVERY 4 HOURS PRN
Status: DISCONTINUED | OUTPATIENT
Start: 2022-04-28 | End: 2022-05-03 | Stop reason: HOSPADM

## 2022-04-28 RX ORDER — OLANZAPINE 10 MG/1
10 TABLET ORAL 3 TIMES DAILY PRN
Status: DISCONTINUED | OUTPATIENT
Start: 2022-04-28 | End: 2022-04-30

## 2022-04-28 RX ORDER — AMOXICILLIN 250 MG
1 CAPSULE ORAL 2 TIMES DAILY PRN
Status: DISCONTINUED | OUTPATIENT
Start: 2022-04-28 | End: 2022-05-03 | Stop reason: HOSPADM

## 2022-04-28 RX ORDER — BUSPIRONE HYDROCHLORIDE 15 MG/1
30 TABLET ORAL 2 TIMES DAILY
Status: DISCONTINUED | OUTPATIENT
Start: 2022-04-29 | End: 2022-04-29

## 2022-04-28 RX ORDER — RISPERIDONE 2 MG/1
2 TABLET ORAL AT BEDTIME
Status: DISCONTINUED | OUTPATIENT
Start: 2022-04-28 | End: 2022-04-28 | Stop reason: HOSPADM

## 2022-04-28 RX ORDER — LEVOTHYROXINE SODIUM 150 UG/1
150 TABLET ORAL DAILY
Status: DISCONTINUED | OUTPATIENT
Start: 2022-04-29 | End: 2022-04-29

## 2022-04-28 RX ORDER — SERTRALINE HYDROCHLORIDE 100 MG/1
200 TABLET, FILM COATED ORAL DAILY
Status: DISCONTINUED | OUTPATIENT
Start: 2022-04-29 | End: 2022-05-03 | Stop reason: HOSPADM

## 2022-04-28 RX ORDER — TRAZODONE HYDROCHLORIDE 50 MG/1
50 TABLET, FILM COATED ORAL
Status: DISCONTINUED | OUTPATIENT
Start: 2022-04-28 | End: 2022-05-03 | Stop reason: HOSPADM

## 2022-04-28 RX ORDER — ACETAMINOPHEN 325 MG/1
650 TABLET ORAL EVERY 4 HOURS PRN
Status: DISCONTINUED | OUTPATIENT
Start: 2022-04-28 | End: 2022-05-03 | Stop reason: HOSPADM

## 2022-04-28 RX ADMIN — BUSPIRONE HYDROCHLORIDE 30 MG: 10 TABLET ORAL at 19:11

## 2022-04-28 RX ADMIN — NALTREXONE HYDROCHLORIDE 50 MG: 50 TABLET, FILM COATED ORAL at 00:46

## 2022-04-28 RX ADMIN — ACETAMINOPHEN 1000 MG: 500 TABLET, FILM COATED ORAL at 18:30

## 2022-04-28 RX ADMIN — TOPIRAMATE 200 MG: 100 TABLET, FILM COATED ORAL at 10:34

## 2022-04-28 RX ADMIN — LEVOTHYROXINE SODIUM 150 MCG: 75 TABLET ORAL at 10:06

## 2022-04-28 RX ADMIN — TOPIRAMATE 200 MG: 100 TABLET, FILM COATED ORAL at 19:11

## 2022-04-28 RX ADMIN — TOPIRAMATE 200 MG: 50 TABLET ORAL at 21:39

## 2022-04-28 RX ADMIN — BUSPIRONE HYDROCHLORIDE 30 MG: 10 TABLET ORAL at 00:46

## 2022-04-28 RX ADMIN — BUPROPION HYDROCHLORIDE 150 MG: 150 TABLET, FILM COATED, EXTENDED RELEASE ORAL at 10:06

## 2022-04-28 RX ADMIN — NALTREXONE HYDROCHLORIDE 50 MG: 50 TABLET, FILM COATED ORAL at 09:09

## 2022-04-28 RX ADMIN — ACETAMINOPHEN 1000 MG: 500 TABLET, FILM COATED ORAL at 10:34

## 2022-04-28 RX ADMIN — RISPERIDONE 2 MG: 2 TABLET ORAL at 00:46

## 2022-04-28 RX ADMIN — LAMOTRIGINE 200 MG: 100 TABLET ORAL at 10:06

## 2022-04-28 RX ADMIN — BUSPIRONE HYDROCHLORIDE 30 MG: 10 TABLET ORAL at 09:09

## 2022-04-28 RX ADMIN — RISPERIDONE 2 MG: 2 TABLET ORAL at 21:39

## 2022-04-28 RX ADMIN — SERTRALINE HYDROCHLORIDE 200 MG: 100 TABLET, FILM COATED ORAL at 10:06

## 2022-04-28 RX ADMIN — TOPIRAMATE 200 MG: 100 TABLET, FILM COATED ORAL at 00:46

## 2022-04-28 RX ADMIN — Medication 25 MG: at 21:39

## 2022-04-28 ASSESSMENT — ACTIVITIES OF DAILY LIVING (ADL)
DRESS: INDEPENDENT
WEAR_GLASSES_OR_BLIND: NO
WALKING_OR_CLIMBING_STAIRS_DIFFICULTY: NO
DRESSING/BATHING_DIFFICULTY: NO
CONCENTRATING,_REMEMBERING_OR_MAKING_DECISIONS_DIFFICULTY: NO
FALL_HISTORY_WITHIN_LAST_SIX_MONTHS: NO
LAUNDRY: WITH SUPERVISION
DOING_ERRANDS_INDEPENDENTLY_DIFFICULTY: NO
DIFFICULTY_EATING/SWALLOWING: NO
HYGIENE/GROOMING: INDEPENDENT
CHANGE_IN_FUNCTIONAL_STATUS_SINCE_ONSET_OF_CURRENT_ILLNESS/INJURY: NO
TOILETING_ISSUES: NO
ORAL_HYGIENE: INDEPENDENT

## 2022-04-28 NOTE — ED NOTES
Pt is feeling anxious this morning and was wicho to get some ret last night. Top was a more agitated by the milieu as certain pts reminded  him of his home life and the stress he experiences at home. Pt feels that the environment is triggering for him and it is causing him increased anxiety. Pt was offered his morning medications and was glad he was able to speak to the psychiatrists. Pt is still having thoughts of SI. Pt's wife updated on plan for pt to go IP. Pt is resting and appears comfortable. Pt does have pain in his L knee r/t torn meniscus. Pt has plans to have surgery on 5/18 and was worried that his psych admission may inhibit. Pt encouraged to speak to his surgeon and psychiatrist. Pt is pleasant and cooperative.

## 2022-04-28 NOTE — ED NOTES
Intake called plan for pt to transfer to [place] [id number] ([phone]) under the care of Dr. Gunn. Report to be given after 1600.

## 2022-04-28 NOTE — ED NOTES
Called to give report at 1600.   [phone]    Peter, RN, said patient cannot be transferred at this time due to staffing issues.    Peter said possibly by 7pm staffing will be resolved. Will call back by 7pm.

## 2022-04-28 NOTE — ED NOTES
1700:  Report given to FADY Green. He gave the OK to transfer patient asap.    1715:  EMS transfer arranged.   ETA = 1830
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
## 2022-04-28 NOTE — TREATMENT PLAN
[facility] Treatment Plan    Client's Name: Sarath Mckeon  YOB: 1970    DSM-5 Diagnoses:     296.89 Bipolar II Disorder Depressed - primary     311 (F32.9) Unspecified Depressive Disorder  - by history     300.00 (F41.9) Unspecified Anxiety Disorder - by history     Psychosocial / Contextual Factors: Patient presented to the [facility] unit with the following concerns: suicidal ideation.     Anticipated number of sessions or this episode of care: 1-4      MeasurableTreatment Goal(s) related to diagnosis / functional impairment(s)    Goal: Reduce SI intensity and establish sense of hope for self and the future.  Objective: identify positives in their life.  Patient will: make a list of positives: relationships, achievements, support, etc.  LMHP will: assist patient in exploring/identifying positives.  Objective: Identify and replace negative thinking patterns.  Patient will: Discuss underlying assumptions and self-talk that may be contributing to hopelessness.  LMHP will: assist client in developing an awareness of and identifying cognitive messages that reinforce hopelessness.  Objective: Explore coping strategies.  Patient will: discuss things that have or may help to distract them or things that help them to feel better.  LMHP will: assist patient in developing coping strategies, ie: physical exercise, less internal focus, increased social activity, more expression of feeling, reaching out to others.    Goal: Patient will increase awareness of depression symptoms and their impact on functioning and develop skills to reduce negative impact.  Objective #A  Patient will describe thoughts, feelings, and actions associated with depression.  Intervention(s)  LMHP will explore and process with patient how depression has impacted them.  Objective #B  Patient will increase depression coping skills.  Intervention(s)  LMHP will teach CBT skills and model their use.      Mental Status Exam  Affect: Flat    Appearance: Appropriate    Attention Span/Concentration: Attentive?    Eye Contact: Engaged   Fund of Knowledge: Appropriate    Language /Speech Content: Fluent   Language /Speech Volume: Normal    Language /Speech Rate/Productions: Normal    Recent Memory: Intact   Remote Memory: Intact   Mood: Depressed    Orientation to Person: Yes    Orientation to Place: Yes   Orientation to Time of Day: Yes    Orientation to Date: Yes    Situation (Do they understand why they are here?): Yes    Psychomotor Behavior: Normal    Thought Content: Suicidal   Thought Form: Intact       PLAN:   Patient will board in [facility] until patient and treatment deem it appropriate to either discharge or admit to a higher level of care. Details: Remain in [facility] overnight for observation and reassess next shift.      ABDIRIZAK QureshiSW

## 2022-04-28 NOTE — TELEPHONE ENCOUNTER
R:Patient cleared and ready for behavioral bed placement: Yes     11:36am Intake paged Dr. Gunn.     12:21pm Intake received a call from Dr. Gunn accepting the pt onto the unit. Dr. Gunn is ED to do a [place] screening before coming.     12:29pm Intake called st 10 to provide disposition to the charge nurse (Jessi) and place pt into the que. Nurse to nurse: 3:30-4pm.     12:34pm Intake called [facility] and spoke to charge nurse Andrew. Intake gave placment information.

## 2022-04-28 NOTE — PROGRESS NOTES
"51 year old male received from ED for suicidal ideation with a plan. Pt reports he always has suicidal ideation, he states he thinks about it everyday. Pt states he came in because the thoughts are \"stronger than I can control.\" Pt states his plan is \"pills, everything I can get my hands on.\" Pt contracts for safety on the unit and states he can let staff know if he feels suicidal and wants to act on his thoughts on the unit. Pt states twelve years ago he overdosed on 40 tylenol pills and did not tell anyone. He states he got a high but nothing else. When he finally told his wife, he went in to get labs and everything \"looked fine.\" Pt states he has a torn right meniscus and will be having surgery on the 18th but is not worried about the surgery. He states his pain is 4/10 and states he takes Tylenol and meloxicam for pain. He states his stressors include his oldest and youngest child are transgender, his Worship upbringing and feeling shame and guilt. Pt reports he sees a therapist once a week and has done so for fifteen years. Pt reports he has sleep apnea but did not bring his machine.     Nursing and risk assessments completed.  Assessments reviewed with LMHP and physician. Video monitoring in progress, patient informed.  Admission information reviewed with patient. Patient given a tour of [facility] and instructions on using the facility. Questions regarding [facility] addressed. Pt search completed and belongings inventoried.  "

## 2022-04-28 NOTE — ED TRIAGE NOTES
Patient here with suicidal thoughts with a plan. He stated he is plan is to take pills. He was seen by his therapist today and was sent her to bee seen. He stated he has been depressed and having increase anxiety.

## 2022-04-28 NOTE — ED PROVIDER NOTES
"  History   Chief Complaint:  Suicidal    The history is provided by the patient.      Sarath Mckeon is a 51 year old male with history of bipolar disorder and depression who presents with suicidal ideation. He has been having suicidal thoughts that he states he cannot control. He has thought about taking pills but has not made such an attempt. He is taking his medications. He does not have any medical concerns.     Review of Systems   Psychiatric/Behavioral: Positive for suicidal ideas.   All other systems reviewed and are negative.      Allergies:  Penicillins  Zithromax [Azithromycin Dihydrate]    Medications:  Buspirone   Citalopram Hydrobromide   Cyclobenzaprine   diazepam   Gabapentin  Levothyroxine Sodium  Methylprednisolone   Ativan    Past Medical History:     Sleep apnea  Thyroid disease  Bipolar disorder     Past Surgical History:    ORTHOPEDIC SURGERY     Social History:  Presents to the ED with wife    Physical Exam     Patient Vitals for the past 24 hrs:   BP Temp Temp src Pulse Resp SpO2 Height Weight   04/27/22 2344 133/83 99.3  F (37.4  C) Oral 78 16 96 % 1.778 m (5' 10\") 147.8 kg (325 lb 14.4 oz)   04/27/22 2044 (!) 141/80 98.5  F (36.9  C) Oral -- 20 99 % 1.778 m (5' 10\") 147.4 kg (325 lb)       Physical Exam  Constitutional:  Oriented to person, place, and time.   HENT:   Head:    Normocephalic.   Mouth/Throat:   Oropharynx is clear and moist.   Eyes:    EOM are normal. Pupils are equal, round, and reactive to light.   Neck:    Neck supple.   Musculoskeletal:  Normal range of motion.   Neurological:   Alert and oriented to person, place, and time.           Moves all 4 extremities spontaneously    Skin:    No rash noted. No pallor.   Psychology:                Positive for suicidal ideation.     Emergency Department Course     Laboratory:  Labs Ordered and Resulted from Time of ED Arrival to Time of ED Departure   COVID-19 VIRUS (CORONAVIRUS) BY PCR - Normal       Result Value    SARS CoV2 PCR " Negative          Emergency Department Course:        Reviewed:  I reviewed nursing notes, vitals and past medical history    Assessments:  2229 I obtained history and examined the patient as noted above.     Disposition:  The patient was transferred to [facility].     Impression & Plan     Medical Decision Making:  Sarath Mckeon is a 51 year old male with history of bipolar disorder who is here with suicide ideation with plan and denies attempt. Covid test is negative. There is no underlying medical concerns and [facility]         Diagnosis:    ICD-10-CM    1. Suicidal ideation  R45.851        Discharge Medications:  New Prescriptions    No medications on file       Scribe Disclosure:  I, aSmmie Hanks, am serving as a scribe at 10:29 PM on 4/27/2022 to document services personally performed by Rico Gold MD based on my observations and the provider's statements to me.              Rico Gold MD  04/28/22 0614

## 2022-04-28 NOTE — TELEPHONE ENCOUNTER
S:  Jeri from [facility] called with 51y/M in [facility] with SI for IP MH.    B:  Pt presents with SI. Pt has been both researching means to kill self, and calling his life insurance company to assure coverage active so wife/family taken care of.     Pt has never been IP MH before.  Pt is coming VOL and reports he does take all his meds as scheduled.  Pt reports he has fantasies of killing himself and fears him as they are appealing.      Pt sees a therapist through [facility], and has a psychiatrist with [facility].    Covid is Negative.  No UTOX Ordered -  Called [facility] to request to be ordered and completed @ 10:28am     A:  Vol    R:  Patient cleared and ready for behavioral bed placement: Yes   Pt placed on adult worklist pending bed availability

## 2022-04-28 NOTE — ED PROVIDER NOTES
[facility] Unit - Psychiatry  Combined Observation Note and Discharge Summary  [facility] Emergency Department  Observation Initiation Date: Apr 27, 2022    Sarath Mckeon MRN: [id number]   Age: 51 year old YOB: 1970     History     Chief Complaint   Patient presents with     Suicidal     HPI  Sarath Mckeon is a 51 year old male with a past history notable for bipolar 2 disorder who presents to the emergency department at the request of his outpatient therapist for evaluation of depressed mood and suicidal thoughts.  Records indicate that the patient had disclosed a plan to overdose on medications as a means of suicide.  He was determined to be medically stable and transferred to the [facility] unit for psychiatric assessment.  On examination, the patient reports that over the past several months, he has been experiencing progressively worsening depressive symptoms.  He identified various psychosocial stressors, mostly related to his home environment and immediate family, which have contributed to his depressive symptoms while diminishing his protective factors against suicide.  He has been taking his medications however symptoms have progressively worsened.  There was no indication of psychosis or homicidal ideation.      Past Medical History  Past Medical History:   Diagnosis Date     Sleep apnea      Thyroid disease      Past Surgical History:   Procedure Laterality Date     ORTHOPEDIC SURGERY       buPROPion (WELLBUTRIN XL) 150 MG 24 hr tablet  BusPIRone HCl (BUSPAR PO)  lamoTRIgine (LAMICTAL) 200 MG tablet  Levothyroxine Sodium (SYNTHROID PO)  LORazepam (ATIVAN) 1 MG tablet  naltrexone (DEPADE/REVIA) 50 MG tablet  risperiDONE (RISPERDAL) 2 MG tablet  sertraline (ZOLOFT) 100 MG tablet  topiramate (TOPAMAX) 200 MG tablet      Allergies   Allergen Reactions     Penicillins      Zithromax [Azithromycin Dihydrate]      Erythromycin Rash     Levothyroxine Rash     Propylthiouracil Rash     Family  "History  No family history on file.  Social History   Social History     Tobacco Use     Smoking status: Former Smoker   Substance Use Topics     Alcohol use: No     Drug use: No      Past medical history, past surgical history, medications, allergies, family history, and social history were reviewed with the patient. No additional pertinent items.       Review of Systems  A complete review of systems was performed with pertinent positives and negatives noted in the HPI, and all other systems negative.    Physical Examination   BP: (!) 141/80  Pulse: 78  Temp: 98.5  F (36.9  C)  Resp: 20  Height: 177.8 cm (5' 10\")  Weight: 147.4 kg (325 lb)  SpO2: 99 %    Physical Exam  General: Appears stated age.   Neuro: Alert and fully oriented. Extremities appear to demonstrate normal strength on visual inspection.   Integumentary/Skin: no rash visualized, normal color    Psychiatric Examination   Appearance: awake, alert  Attitude:  cooperative  Eye Contact:  poor   Mood:  depressed  Affect:  mood congruent and dysphoric  Speech:  clear, coherent  Psychomotor Behavior:  no evidence of tardive dyskinesia, dystonia, or tics  Thought Process:  linear  Associations:  no loose associations  Thought Content:  no evidence of psychotic thought and active suicidal ideation present  Insight:  fair  Judgement:  fair  Oriented to:  time, person, and place  Attention Span and Concentration:  fair  Recent and Remote Memory:  fair  Language: able to name/identify objects without impairment  Fund of Knowledge: intact with awareness of current and past events    ED Course        Labs Ordered and Resulted from Time of ED Arrival to Time of ED Departure   COVID-19 VIRUS (CORONAVIRUS) BY PCR - Normal       Result Value    SARS CoV2 PCR Negative         Assessments & Plan (with Medical Decision Making)   Patient presenting with depressed mood and suicidal ideation. Nursing notes reviewed noting no acute issues.     I have reviewed the assessment " completed by the [facility].     During the observation period, the patient did not require medications for agitation, and did not require restraints/seclusion for patient and/or provider safety.    After a period in observation care, it was determined that extending her treatment course in the [facility] unit would likely not result in enough improvements to transition back to the outpatient setting.  Therefore, the decision was made to pursue admission to inpatient psychiatry.      Preliminary diagnosis:    ICD-10-CM    1. Suicidal ideation  R45.851    2. Bipolar II disorder, severe, depressed, with anxious distress (H)  F31.81         Treatment Plan:  -His prior to admission medications have been resumed however really talked about potential medication changes that would focus on mood stabilization.  Specifically, we discussed transitioning off of risperidone to allow initiation of a different antidepressant which could aid more with treating a depressive phase of a bipolar disorder.  Some options that we discussed included Abilify, Depakote, and lithium although I would prefer to avoid lithium given his history of medication overdoses.  I will defer medication changes to his inpatient team unless his stay on the [facility] unit will be prolonged due to bed availability.  -Transfer to inpatient psychiatry as I anticipate stabilization will take longer than the time we will be allotted on the EmPATH unit.    --  Tony Su MD   [facility] EMERGENCY DEPT  Em[facility] Unit  4/27/2022         Tony Su MD  04/28/22 1031

## 2022-04-28 NOTE — SAFE
Sarath Mckeon  April 28, 2022  SAFE Note    Critical Safety Issues: Patient presents to the emergency department at the request of his outpatient therapist for evaluation of depressed mood and suicidal thoughts.  Records indicate that the patient had disclosed a plan to overdose on medications and has endorsed preparatory measures       Current Suicidal Ideation/Self-Injurious Concerns/Methods: Ingestion overdose on medications       Current or Historical Inappropriate Sexual Behavior: No      Current or Historical Aggression/Homicidal Ideation: None - N/A    Triggers: increased depression, overall stress    Guardianship Status: [facility] Guardian: is his own guardian.    Updated care team: Yes: Dr. Su, provider made recommendation for [facility]     For additional details see full [facility] assessment.       CASIMIRO Villavicencio

## 2022-04-29 LAB
ALBUMIN SERPL-MCNC: 3.9 G/DL (ref 3.4–5)
ALP SERPL-CCNC: 85 U/L (ref 40–150)
ALT SERPL W P-5'-P-CCNC: 26 U/L (ref 0–70)
AMPHETAMINES UR QL SCN: ABNORMAL
ANION GAP SERPL CALCULATED.3IONS-SCNC: 7 MMOL/L (ref 3–14)
AST SERPL W P-5'-P-CCNC: 16 U/L (ref 0–45)
BARBITURATES UR QL: ABNORMAL
BASOPHILS # BLD AUTO: 0.1 10E3/UL (ref 0–0.2)
BASOPHILS NFR BLD AUTO: 0 %
BENZODIAZ UR QL: ABNORMAL
BILIRUB SERPL-MCNC: 0.9 MG/DL (ref 0.2–1.3)
BUN SERPL-MCNC: 18 MG/DL (ref 7–30)
CALCIUM SERPL-MCNC: 9.2 MG/DL (ref 8.5–10.1)
CANNABINOIDS UR QL SCN: ABNORMAL
CHLORIDE BLD-SCNC: 107 MMOL/L (ref 94–109)
CHOLEST SERPL-MCNC: 160 MG/DL
CO2 SERPL-SCNC: 27 MMOL/L (ref 20–32)
COCAINE UR QL: ABNORMAL
CREAT SERPL-MCNC: 1.22 MG/DL (ref 0.66–1.25)
EOSINOPHIL # BLD AUTO: 0.2 10E3/UL (ref 0–0.7)
EOSINOPHIL NFR BLD AUTO: 1 %
ERYTHROCYTE [DISTWIDTH] IN BLOOD BY AUTOMATED COUNT: 14.7 % (ref 10–15)
ETHANOL UR QL SCN: ABNORMAL
GFR SERPL CREATININE-BSD FRML MDRD: 72 ML/MIN/1.73M2
GLUCOSE BLD-MCNC: 122 MG/DL (ref 70–99)
HCT VFR BLD AUTO: 45.9 % (ref 40–53)
HDLC SERPL-MCNC: 57 MG/DL
HGB BLD-MCNC: 16.4 G/DL (ref 13.3–17.7)
IMM GRANULOCYTES # BLD: 0.1 10E3/UL
IMM GRANULOCYTES NFR BLD: 1 %
LDLC SERPL CALC-MCNC: 85 MG/DL
LYMPHOCYTES # BLD AUTO: 4 10E3/UL (ref 0.8–5.3)
LYMPHOCYTES NFR BLD AUTO: 30 %
MCH RBC QN AUTO: 29.9 PG (ref 26.5–33)
MCHC RBC AUTO-ENTMCNC: 35.7 G/DL (ref 31.5–36.5)
MCV RBC AUTO: 84 FL (ref 78–100)
MONOCYTES # BLD AUTO: 0.7 10E3/UL (ref 0–1.3)
MONOCYTES NFR BLD AUTO: 5 %
NEUTROPHILS # BLD AUTO: 8.4 10E3/UL (ref 1.6–8.3)
NEUTROPHILS NFR BLD AUTO: 63 %
NONHDLC SERPL-MCNC: 103 MG/DL
NRBC # BLD AUTO: 0 10E3/UL
NRBC BLD AUTO-RTO: 0 /100
OPIATES UR QL SCN: ABNORMAL
PLATELET # BLD AUTO: 176 10E3/UL (ref 150–450)
POTASSIUM BLD-SCNC: 3.5 MMOL/L (ref 3.4–5.3)
PROT SERPL-MCNC: 8 G/DL (ref 6.8–8.8)
RBC # BLD AUTO: 5.49 10E6/UL (ref 4.4–5.9)
SODIUM SERPL-SCNC: 141 MMOL/L (ref 133–144)
TRIGL SERPL-MCNC: 89 MG/DL
TSH SERPL DL<=0.005 MIU/L-ACNC: 0.84 MU/L (ref 0.4–4)
WBC # BLD AUTO: 13.4 10E3/UL (ref 4–11)

## 2022-04-29 PROCEDURE — 80053 COMPREHEN METABOLIC PANEL: CPT | Performed by: PSYCHIATRY & NEUROLOGY

## 2022-04-29 PROCEDURE — 5A09357 ASSISTANCE WITH RESPIRATORY VENTILATION, LESS THAN 24 CONSECUTIVE HOURS, CONTINUOUS POSITIVE AIRWAY PRESSURE: ICD-10-PCS | Performed by: PSYCHIATRY & NEUROLOGY

## 2022-04-29 PROCEDURE — 250N000013 HC RX MED GY IP 250 OP 250 PS 637: Performed by: PSYCHIATRY & NEUROLOGY

## 2022-04-29 PROCEDURE — 99223 1ST HOSP IP/OBS HIGH 75: CPT | Mod: AI | Performed by: PSYCHIATRY & NEUROLOGY

## 2022-04-29 PROCEDURE — 80307 DRUG TEST PRSMV CHEM ANLYZR: CPT | Performed by: PSYCHIATRY & NEUROLOGY

## 2022-04-29 PROCEDURE — 85004 AUTOMATED DIFF WBC COUNT: CPT | Performed by: PSYCHIATRY & NEUROLOGY

## 2022-04-29 PROCEDURE — 80061 LIPID PANEL: CPT | Performed by: PSYCHIATRY & NEUROLOGY

## 2022-04-29 PROCEDURE — 124N000002 HC R&B MH UMMC

## 2022-04-29 PROCEDURE — 84443 ASSAY THYROID STIM HORMONE: CPT | Performed by: PSYCHIATRY & NEUROLOGY

## 2022-04-29 PROCEDURE — 250N000013 HC RX MED GY IP 250 OP 250 PS 637

## 2022-04-29 PROCEDURE — 36415 COLL VENOUS BLD VENIPUNCTURE: CPT | Performed by: PSYCHIATRY & NEUROLOGY

## 2022-04-29 RX ORDER — RISPERIDONE 1 MG/1
1 TABLET ORAL AT BEDTIME
Status: DISCONTINUED | OUTPATIENT
Start: 2022-04-29 | End: 2022-05-03 | Stop reason: HOSPADM

## 2022-04-29 RX ORDER — MELOXICAM 15 MG/1
15 TABLET ORAL DAILY
COMMUNITY
End: 2023-09-08

## 2022-04-29 RX ORDER — DIPHENHYDRAMINE HCL 25 MG
25 CAPSULE ORAL EVERY 6 HOURS PRN
Status: DISCONTINUED | OUTPATIENT
Start: 2022-04-29 | End: 2022-05-03 | Stop reason: HOSPADM

## 2022-04-29 RX ORDER — MELOXICAM 7.5 MG/1
15 TABLET ORAL DAILY
Status: DISCONTINUED | OUTPATIENT
Start: 2022-04-29 | End: 2022-05-03 | Stop reason: HOSPADM

## 2022-04-29 RX ORDER — TESTOSTERONE CYPIONATE 200 MG/ML
110 INJECTION, SOLUTION INTRAMUSCULAR
COMMUNITY

## 2022-04-29 RX ORDER — BUSPIRONE HYDROCHLORIDE 15 MG/1
15 TABLET ORAL 2 TIMES DAILY
Status: DISCONTINUED | OUTPATIENT
Start: 2022-04-29 | End: 2022-04-30

## 2022-04-29 RX ORDER — LEVOTHYROXINE SODIUM 150 MCG
150 TABLET ORAL DAILY
COMMUNITY

## 2022-04-29 RX ORDER — BUSPIRONE HYDROCHLORIDE 30 MG/1
30 TABLET ORAL 2 TIMES DAILY
COMMUNITY
End: 2023-09-08

## 2022-04-29 RX ORDER — LORAZEPAM 1 MG/1
1 TABLET ORAL DAILY PRN
Status: DISCONTINUED | OUTPATIENT
Start: 2022-04-29 | End: 2022-05-03 | Stop reason: HOSPADM

## 2022-04-29 RX ORDER — ARIPIPRAZOLE 5 MG/1
2.5 TABLET ORAL DAILY
Status: DISCONTINUED | OUTPATIENT
Start: 2022-04-29 | End: 2022-05-02

## 2022-04-29 RX ORDER — TESTOSTERONE CYPIONATE 200 MG/ML
150 INJECTION, SOLUTION INTRAMUSCULAR
Status: DISCONTINUED | OUTPATIENT
Start: 2022-05-01 | End: 2022-05-03 | Stop reason: HOSPADM

## 2022-04-29 RX ORDER — NALTREXONE HYDROCHLORIDE 50 MG/1
50 TABLET, FILM COATED ORAL 2 TIMES DAILY
Status: DISCONTINUED | OUTPATIENT
Start: 2022-04-29 | End: 2022-05-03 | Stop reason: HOSPADM

## 2022-04-29 RX ORDER — LEVOTHYROXINE SODIUM 75 UG/1
150 TABLET ORAL
Status: DISCONTINUED | OUTPATIENT
Start: 2022-04-29 | End: 2022-05-03 | Stop reason: HOSPADM

## 2022-04-29 RX ADMIN — ACETAMINOPHEN 650 MG: 325 TABLET ORAL at 17:37

## 2022-04-29 RX ADMIN — SERTRALINE 200 MG: 100 TABLET, FILM COATED ORAL at 07:57

## 2022-04-29 RX ADMIN — ACETAMINOPHEN 650 MG: 325 TABLET ORAL at 08:02

## 2022-04-29 RX ADMIN — LEVOTHYROXINE SODIUM 150 MCG: 0.07 TABLET ORAL at 08:48

## 2022-04-29 RX ADMIN — TOPIRAMATE 200 MG: 50 TABLET ORAL at 21:44

## 2022-04-29 RX ADMIN — BUSPIRONE HYDROCHLORIDE 15 MG: 15 TABLET ORAL at 21:45

## 2022-04-29 RX ADMIN — BUPROPION HYDROCHLORIDE 150 MG: 150 TABLET, EXTENDED RELEASE ORAL at 07:57

## 2022-04-29 RX ADMIN — RISPERIDONE 1 MG: 1 TABLET ORAL at 21:45

## 2022-04-29 RX ADMIN — Medication 25 MG: at 13:27

## 2022-04-29 RX ADMIN — Medication 25 MG: at 07:57

## 2022-04-29 RX ADMIN — MELOXICAM 15 MG: 7.5 TABLET ORAL at 11:41

## 2022-04-29 RX ADMIN — NALTREXONE HYDROCHLORIDE 50 MG: 50 TABLET, FILM COATED ORAL at 21:45

## 2022-04-29 RX ADMIN — ACETAMINOPHEN 650 MG: 325 TABLET ORAL at 12:58

## 2022-04-29 RX ADMIN — BUSPIRONE HYDROCHLORIDE 30 MG: 15 TABLET ORAL at 07:57

## 2022-04-29 RX ADMIN — TOPIRAMATE 200 MG: 50 TABLET ORAL at 07:57

## 2022-04-29 RX ADMIN — Medication 2.5 MG: at 11:41

## 2022-04-29 RX ADMIN — LAMOTRIGINE 200 MG: 200 TABLET ORAL at 07:57

## 2022-04-29 ASSESSMENT — ACTIVITIES OF DAILY LIVING (ADL)
ORAL_HYGIENE: INDEPENDENT
DRESS: INDEPENDENT
HYGIENE/GROOMING: INDEPENDENT
LAUNDRY: WITH SUPERVISION

## 2022-04-29 NOTE — PLAN OF CARE
Occupational Therapy Group Note:     04/29/22 1300   Group Therapy Session   Group Attendance attended group session   Time Session Began 1015   Time Session Ended 1215   Total Time patient participated (minutes) 30  (no charge)   Total # Attendees 5   Group Type task skill   Group Topic Covered cognitive activities;coping skills/lifestyle management   Group Session Detail OT clinic   Patient Response/Contribution cooperative with task;organized   Patient Response Detail Pt actively participated in occupational therapy clinic to facilitate coping skill exploration, creative expression within personally meaningful activities, and clinical observation of social, cognitive, and kinesthetic performance skills. Pt response: Independent to initiate, gather materials, sequence and adjust to workspace demands as needed. Demonstrated good attention to task and efficient visual scanning for this moderately complex, goal-directed task. Able to ask for assistance and appeared comfortable interacting with peers and staff when conversation was initiated with him. Pleasant and polite on approach. Will continue to assess. Pt will be given self-assessment form, and OT staff will explain the purpose of including them in their treatment plan and offer options for meeting their needs. Initial assessment to be completed upon additional group participation.
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
## 2022-04-29 NOTE — PLAN OF CARE
Patient is alert and oriented, presents with a blunted, flat affect, his mood is anxious and depressed but pleasant upon approach. Patient was present in the milieu, did not attend groups this shift. Patient c/o pain to his left knee, used ice pack but requested for PRN Tylenol, administered @ 1730 with some effect.  Patient denies SI/SIB/HI and hallucinations, denies feeling anxious or depressed.  Patient is medication compliant, VS stable.    No medication side effects reported or observed.

## 2022-04-29 NOTE — PLAN OF CARE
NOC Shift Report    Pt in bed at beginning of shift, breathing quiet and unlabored. Pt slept through shift. Pt slept 6.5 hours.     No pt complaints or concerns at this time.     No PRNs given. Will continue to monitor.     Precautions: SI

## 2022-04-29 NOTE — PHARMACY-ADMISSION MEDICATION HISTORY
Admission Medication History Completed by Pharmacy    See [facility] Admission Navigator for allergy information, preferred outpatient pharmacy, prior to admission medications and immunization status.     Medication History Sources:     Patient interview    [facility] [phone]    Changes made to PTA medication list (reason):    Added: Meloxicam, testosterone IM injections    Deleted: None    Changed: Lorazepam 0.5 mg q6h PRN > 1 mg BID PRN; naltrexone 25 mg BID > 50 mg BID    Additional Information:    Patient reports he regularly takes 1 mg of Ativan daily in the afternoon. He has tried to take it twice a day, but notices the morning dose leaves him too groggy.    He takes meloxicam for knee pain.    Patient is allergic to generic levothyroxine and must get the brand name product only. He reports some itchiness this morning after taking the generic while at [facility].    Prior to Admission medications    Medication Sig Last Dose Taking? Auth Provider   buPROPion (WELLBUTRIN XL) 150 MG 24 hr tablet Take 150 mg by mouth every morning 4/27/2022 Yes Reported, Patient   busPIRone HCl (BUSPAR) 30 MG tablet Take 15 mg by mouth 2 times daily 4/27/2022 Yes Unknown, Entered By History   lamoTRIgine (LAMICTAL) 200 MG tablet Take 200 mg by mouth daily 4/27/2022 Yes Reported, Patient   LORazepam (ATIVAN) 1 MG tablet Take 1 mg by mouth 2 times daily as needed for anxiety 4/27/2022 Yes Reported, Patient   meloxicam (MOBIC) 15 MG tablet Take 15 mg by mouth daily 4/27/2022 Yes Unknown, Entered By History   naltrexone (DEPADE/REVIA) 50 MG tablet Take 50 mg by mouth 2 times daily 4/27/2022 Yes Reported, Patient   risperiDONE (RISPERDAL) 2 MG tablet Take 2 mg by mouth At Bedtime 4/26/2022 Yes Reported, Patient   sertraline (ZOLOFT) 100 MG tablet Take 200 mg by mouth daily 4/27/2022 Yes Reported, Patient   SYNTHROID 150 MCG tablet Take 150 mcg by mouth daily *MUST BE BRAND NAME - ALLERGIC TO GENERIC* 4/27/2022 Yes Unknown, Entered By  History   testosterone cypionate (DEPOTESTOSTERONE) 200 MG/ML injection Inject 150 mg into the muscle every 14 days 4/17/2022 Yes Unknown, Entered By History   topiramate (TOPAMAX) 100 MG tablet Take 200 mg by mouth 2 times daily 4/27/2022 Yes Reported, Patient       Date completed: 04/29/22    Medication history completed by:   Sumi Baird, PharmD, [facility]  Clinical Pharmacist

## 2022-04-29 NOTE — CONSULTS
Patient has [employer] through an employer.    Aripiprazole: $0.    Katherine Knight  Pharmacy Technician/Liaison, Discharge Pharmacy   [phone]  [email or web address]

## 2022-04-29 NOTE — PLAN OF CARE
"RN Shift Summary     Patient was calm, cooperative, and pleasant in all interactions. He isolated to his room for much of the day, resting and icing his knee, but tried groups with encouragement. Patient was oriented to the unit and programming.     Patient denies SI at the time of conversation with writer, but he agreed to keep staff up to date on his thoughts and SI \"fantasies.\"     Vitals: B/P: 123/74, T: 98.9, P: 87    Patient was medication compliant today. Patient declined morning levothyroxine, stating that he gets a rash from generic. He states he developed a rash yesterday, which is still lingering today. RN assessed rash, and his back was slightly reddened in the center. He reports it is itchy but not painful. MD notified. Pharmacy sent supply of brand-name Synthroid. He requested PRN Tylenol in the morning for a headache and PRN Tylenol around lunchtime for a headache.    Patient's wife plans to visit this evening and bring his CPAP from home.  "

## 2022-04-29 NOTE — PLAN OF CARE
04/28/22 2000   Patient Belongings   Did you bring any home meds/supplements to the hospital?  No   Patient Belongings sent to security per site process;locker   Patient Belongings Put in Hospital Secure Location (Security or Locker, etc.) clothing   Belongings Search Yes   Clothing Search Yes   Second Staff Peter RN       LOCKER:  Shoes, Socks, Pants, Shirt, Mask.    4/29/2022: CPAP machine, cord, tubing, mask, deodorant, 3 pairs of socks, 3 pairs of underwear, 1 tshirt    A               Admission:  I am responsible for any personal items that are not sent to the safe or pharmacy.  [facility] is not responsible for loss, theft or damage of any property in my possession.    Signature:  _________________________________ Date: _______  Time: _____                                              Staff Signature:  ____________________________ Date: ________  Time: _____      2nd Staff person, if patient is unable/unwilling to sign:    Signature: ________________________________ Date: ________  Time: _____     Discharge:  [facility] has returned all of my personal belongings:    Signature: _________________________________ Date: ________  Time: _____                                          Staff Signature:  ____________________________ Date: ________  Time: _____

## 2022-04-29 NOTE — PLAN OF CARE
"  Problem: Behavioral Health Plan of Care  Goal: Plan of Care Review  Outcome: Ongoing, Not Progressing   Goal Outcome Evaluation:    Sarath is a 51 year-old man who is admitted from [facility] ED on a voluntary basis for worsening depression and suicidal ideation. Per chart review, Pt presents with SI. Pt has been both researching means to kill self, and calling his life insurance company to assure coverage active so wife/family taken care of. Pt has never been IP MH before. Pt reports he does take all his meds as scheduled.  Pt reports he has fantasies of killing himself and fears him as they are appealing. Pt sees a therapist through [facility], and has a psychiatrist with [facility].    Upon admission interview, pt presented with a depressed mood and blunted affect. Stated \" I've been struggling with suicidal ideations for a couple of weeks.\" Reported having fleeting suicidal thoughts at this time. Pt reported two suicide attempts: first one via overdose and the second via hanging when he was in college.  Pt uses a C-PAP at night. Pt was cooperative and polite.   Plan: Status Individual Observation; Build trust with pt. Continue to build on strengths. Encourage healthy coping.     Admission Notification: Wife                        "
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
## 2022-04-29 NOTE — ED NOTES
Patient transferred off the unit at 1915 accompanied by EMS to [facility] at [facility]. Mood appeared stable. Disposition pleasant and cooperative. Patient agrees to inpatient status.    Patient took evening dose of Buspar and Topamax at 1900. Naltrexone and Risperdal still needed once patient arrives to [facility] 10. Currently denies SI and HI. All belongings that were brought into the hospital have been returned to patient including socks, shoes and grey face mask. Black winter coat and phone with wife at home.

## 2022-04-29 NOTE — PLAN OF CARE
04/29/22 1656   Individualization/Patient Specific Goals   Patient Personal Strengths community support;expressive of emotions;expressive of needs;family/social support;intellectual cognitive skills;interests/hobbies;medication/treatment adherence;parenting skills;positive vocational history;positive educational history;socioeconomic stability;stable living environment   Patient Vulnerabilities family/relationship conflict;adverse childhood experience(s)   Interprofessional Rounds   Participants CTC;psychiatrist;social work   Team Discussion   Participants Dr. Yohana Gunn, Krysten Chaudhari RN, Sonia Joel [facility]   Progress new admission   Anticipated length of stay 7 days   Continued Stay Criteria/Rationale Patient requires stabilizaiton of pyschiatric symptoms.   Medical/Physical no concerns noted.   Plan Patient will receive psychiatric and nursing cares on unit, will be offered OT and psychotherapy groups. CTC will assist with treatment/disposition planning, as well as plans for outpatient services for following discharge   Rationale for change in precautions or plan new admission   Anticipated Discharge Disposition home with family   PRECAUTIONS AND SAFETY    Behavioral Orders   Procedures    Code 1 - Restrict to Unit    Fall precautions    Routine Programming     As clinically indicated    Status 15     Every 15 minutes.    Status Individual Observation     Patient SIO status reviewed with team/RN.  Please also refer to RN/team documentation for add'l detail.    -SIO staff to monitor following which have contributed to patient being on SIO:  Suicidal ideation.  Has CPAP.  RN requesting that provider complete in-person assessment to determine whether he can maintain safety with CPAP.  -Possible interventions SIO staff could use to support patient's treatment progress:  Monitor to ensure safety.  -When following observed, team will review discontinuation of SIO:  Ability to contract for safety with  CPAP     Order Specific Question:   CONTINUOUS 24 hours / day     Answer:   Other     Order Specific Question:   Specify distance     Answer:   10 feet, at door during night     Order Specific Question:   Indications for SIO     Answer:   Medical equipment / ligature risk     Order Specific Question:   Indications for SIO     Answer:   Suicide risk    Suicide precautions     Patients on Suicide Precautions should have a Combination Diet ordered that includes a Diet selection(s) AND a Behavioral Tray selection for Safe Tray - with utensils, or Safe Tray - NO utensils         Safety  Safety WDL: WDL  Patient Location: patient room, own  Observed Behavior: calm  Safety Measures: safety rounds completed  Suicidality: Status 15

## 2022-04-30 LAB — GLUCOSE BLDC GLUCOMTR-MCNC: 110 MG/DL (ref 70–99)

## 2022-04-30 PROCEDURE — 124N000002 HC R&B MH UMMC

## 2022-04-30 PROCEDURE — 250N000013 HC RX MED GY IP 250 OP 250 PS 637: Performed by: PSYCHIATRY & NEUROLOGY

## 2022-04-30 PROCEDURE — 250N000013 HC RX MED GY IP 250 OP 250 PS 637

## 2022-04-30 PROCEDURE — 250N000013 HC RX MED GY IP 250 OP 250 PS 637: Performed by: STUDENT IN AN ORGANIZED HEALTH CARE EDUCATION/TRAINING PROGRAM

## 2022-04-30 RX ORDER — BUSPIRONE HYDROCHLORIDE 15 MG/1
30 TABLET ORAL 2 TIMES DAILY
Status: DISCONTINUED | OUTPATIENT
Start: 2022-04-30 | End: 2022-05-03 | Stop reason: HOSPADM

## 2022-04-30 RX ORDER — OLANZAPINE 10 MG/2ML
10 INJECTION, POWDER, FOR SOLUTION INTRAMUSCULAR 3 TIMES DAILY PRN
Status: DISCONTINUED | OUTPATIENT
Start: 2022-04-30 | End: 2022-05-03 | Stop reason: HOSPADM

## 2022-04-30 RX ORDER — OLANZAPINE 5 MG/1
5-10 TABLET ORAL 3 TIMES DAILY PRN
Status: DISCONTINUED | OUTPATIENT
Start: 2022-04-30 | End: 2022-05-03 | Stop reason: HOSPADM

## 2022-04-30 RX ADMIN — LEVOTHYROXINE SODIUM 150 MCG: 0.07 TABLET ORAL at 06:50

## 2022-04-30 RX ADMIN — BUSPIRONE HYDROCHLORIDE 15 MG: 15 TABLET ORAL at 07:54

## 2022-04-30 RX ADMIN — DIPHENHYDRAMINE HYDROCHLORIDE 25 MG: 25 CAPSULE ORAL at 19:10

## 2022-04-30 RX ADMIN — NALTREXONE HYDROCHLORIDE 50 MG: 50 TABLET, FILM COATED ORAL at 07:55

## 2022-04-30 RX ADMIN — MELOXICAM 15 MG: 7.5 TABLET ORAL at 07:55

## 2022-04-30 RX ADMIN — TOPIRAMATE 200 MG: 50 TABLET ORAL at 21:38

## 2022-04-30 RX ADMIN — LAMOTRIGINE 200 MG: 200 TABLET ORAL at 07:54

## 2022-04-30 RX ADMIN — Medication 2.5 MG: at 07:55

## 2022-04-30 RX ADMIN — TOPIRAMATE 200 MG: 50 TABLET ORAL at 07:55

## 2022-04-30 RX ADMIN — NALTREXONE HYDROCHLORIDE 50 MG: 50 TABLET, FILM COATED ORAL at 21:38

## 2022-04-30 RX ADMIN — SERTRALINE 200 MG: 100 TABLET, FILM COATED ORAL at 07:56

## 2022-04-30 RX ADMIN — LORAZEPAM 1 MG: 1 TABLET ORAL at 17:00

## 2022-04-30 RX ADMIN — BUSPIRONE HYDROCHLORIDE 30 MG: 15 TABLET ORAL at 21:38

## 2022-04-30 RX ADMIN — BUPROPION HYDROCHLORIDE 150 MG: 150 TABLET, EXTENDED RELEASE ORAL at 07:55

## 2022-04-30 RX ADMIN — RISPERIDONE 1 MG: 1 TABLET ORAL at 21:38

## 2022-04-30 ASSESSMENT — ACTIVITIES OF DAILY LIVING (ADL)
DRESS: INDEPENDENT
HYGIENE/GROOMING: INDEPENDENT
LAUNDRY: WITH SUPERVISION
ORAL_HYGIENE: INDEPENDENT

## 2022-04-30 NOTE — PLAN OF CARE
RN Shift Summary     Patient was calm, cooperative, and appropriate in all interactions. Patient's affect was depressed today. He reported that he did not sleep well last night due to frequent waking secondary to knee pain. Patient did not request Tylenol or hot packs today, stating that his prescribed medications managed his pain well. Patient's son visited, and he reported that the visit went well.    Patient denies SI/SIB/HI at this time. Patient encouraged to discuss SI with staff if thoughts should occur, to which he agreed.     Vitals: B/P: 117/78, T: 98.2, P: 77, R: 16    Patient was medication compliant today. He reported that at home, he takes Buspar 30 mg BID, though he has been prescribed 15 mg BID in the hospital. He requested to increase to match home dose. RN notified MD, and the order was changed to begin the evening of 4/30 at Buspar 30 mg BID. No PRNs requested today, and patient was intentional about resting/icing his knee.

## 2022-04-30 NOTE — PLAN OF CARE
Night Shift Summary (4/29/22 into 04/30/22)    Pt is in bed sleeping at start of shift. Breathing quiet and unlabored. Appears to have slept 7 hours. Writer unable to assess SI,SIB,HI, and pain as patient slept all shift. No episodes or events occurred this shift. Patient remains on standard 15 minute safety checks and a 1:1 SIO at night for safety with CPAP equipment.    Will continue to monitor and assess.

## 2022-05-01 PROCEDURE — 250N000013 HC RX MED GY IP 250 OP 250 PS 637: Performed by: PSYCHIATRY & NEUROLOGY

## 2022-05-01 PROCEDURE — 250N000013 HC RX MED GY IP 250 OP 250 PS 637: Performed by: STUDENT IN AN ORGANIZED HEALTH CARE EDUCATION/TRAINING PROGRAM

## 2022-05-01 PROCEDURE — 124N000002 HC R&B MH UMMC

## 2022-05-01 RX ADMIN — BUPROPION HYDROCHLORIDE 150 MG: 150 TABLET, EXTENDED RELEASE ORAL at 07:44

## 2022-05-01 RX ADMIN — RISPERIDONE 1 MG: 1 TABLET ORAL at 20:09

## 2022-05-01 RX ADMIN — NALTREXONE HYDROCHLORIDE 50 MG: 50 TABLET, FILM COATED ORAL at 07:45

## 2022-05-01 RX ADMIN — DIPHENHYDRAMINE HYDROCHLORIDE 25 MG: 25 CAPSULE ORAL at 08:43

## 2022-05-01 RX ADMIN — LEVOTHYROXINE SODIUM 150 MCG: 0.07 TABLET ORAL at 07:37

## 2022-05-01 RX ADMIN — BUSPIRONE HYDROCHLORIDE 30 MG: 15 TABLET ORAL at 20:09

## 2022-05-01 RX ADMIN — MELOXICAM 15 MG: 7.5 TABLET ORAL at 07:44

## 2022-05-01 RX ADMIN — LAMOTRIGINE 200 MG: 200 TABLET ORAL at 07:44

## 2022-05-01 RX ADMIN — Medication 2.5 MG: at 07:45

## 2022-05-01 RX ADMIN — BUSPIRONE HYDROCHLORIDE 30 MG: 15 TABLET ORAL at 07:45

## 2022-05-01 RX ADMIN — NALTREXONE HYDROCHLORIDE 50 MG: 50 TABLET, FILM COATED ORAL at 20:09

## 2022-05-01 RX ADMIN — DIPHENHYDRAMINE HYDROCHLORIDE 25 MG: 25 CAPSULE ORAL at 18:40

## 2022-05-01 RX ADMIN — SERTRALINE 200 MG: 100 TABLET, FILM COATED ORAL at 07:44

## 2022-05-01 RX ADMIN — TOPIRAMATE 200 MG: 50 TABLET ORAL at 07:44

## 2022-05-01 RX ADMIN — ACETAMINOPHEN 650 MG: 325 TABLET ORAL at 16:53

## 2022-05-01 RX ADMIN — TOPIRAMATE 200 MG: 50 TABLET ORAL at 20:09

## 2022-05-01 ASSESSMENT — ACTIVITIES OF DAILY LIVING (ADL)
DRESS: INDEPENDENT
HYGIENE/GROOMING: INDEPENDENT
ORAL_HYGIENE: INDEPENDENT
LAUNDRY: WITH SUPERVISION

## 2022-05-01 NOTE — PLAN OF CARE
NOC Shift Report     Pt in bed at beginning of shift, breathing quiet and unlabored. Pt slept through shift. Pt slept 7 hours.      No pt complaints or concerns at this time.      No PRNs given. Will continue to monitor.      Precautions: SI

## 2022-05-01 NOTE — PLAN OF CARE
"Pt complained of left knee pain related to meniscus tear and back pain and hip pain from his bed. Pt was given PRN tylenol with some relief. Pt was offered an ice pack but declined at this time. Writer received order from on call for soft care mattress which was given. Pt was visible in the milieu most of the evening. Pt is withdrawn to self reading a book. Affect is flat but brightens upon approach. Pt did experience the \"muscle quivering\" again this evening around the same time as last evening. Pt was given PRN benadryl 25 mg with relief. Writer reminded pt to speak with provider before taking morning abilify as a precaution in case this is a side effect. Pt verbalized understanding. Pt states he has history of adverse reactions to some psychiatric medications such as Latuda which caused vomiting and Paxil which caused bad headaches. These reactions were added to pt's allergy list. Pt states he is \"feeling pretty good\" in the hospital and has not experienced any SI/SIB thoughts today. Pt states he is nervous about returning home due to stressors causing intensity of depression and returning of suicidal thoughts and intent. Pt states his wife would like him to do a IOP option when he comes home so he can have some extra support and pt agrees with this. Writer told pt to discuss this with team tomorrow. Writer encouraged pt to make a plan for himself at home so that he can always make time for self care and can have a plan to separate himself from the stress if he is becoming overwhelmed. Writer also encouraged pt that it is always okay and important to ask for help. Pt agreed with these ideas. Pt states that he is hoping this hospitalization will produce medication changes that will be helpful for his depression. He states in the past he has been able to \"pull myself out of it\" and states that with this bout, he was unable to do that. Appetite and fluid intake adequate. VSS.   "

## 2022-05-01 NOTE — PLAN OF CARE
RN Shift Summary     Patient was calm and cooperative today. He was friendly in all interactions and made his needs known appropriately. Patient spent the day isolative to self, reading or resting. His wife visited after lunch.    Patient denies SI/SIB/HI or anxiety at this time. No signs of roz of hypomania noted today, though patient reports he felt some symptoms the previous evening.    Vitals: B/P: 109/71, T: 98.2, P: 78, R: 16    Patient was medication compliant today.  About 1 hour after morning medications, patient reported itchiness on his back that was starting to progress over his neck and the top of his head. No redness or rash noted. PRN Benadryl administered. Patient reports that he has a history of notalgia paresthetica, in which he experiences nerve excitability in his back/neck that gives him a feeling of itchiness and sometimes subtle twitching of the back muscles. He reports that when this occurs, he uses cold towels on his back. RN applied cold towels to back and neck, along with an ice pack, in conjunction with Benadryl. After about 30 minutes, patient reported feeling much better. He intends to discuss further with MD tomorrow, as he notes this is also the second day he has had unusual symptoms and the second day of taking Abilify. He shared that he has a long history of reactions to new psychiatric medications, so he is hoping to discuss with MD before next Abilify dose on 5/2.

## 2022-05-02 PROCEDURE — 124N000002 HC R&B MH UMMC

## 2022-05-02 PROCEDURE — 99233 SBSQ HOSP IP/OBS HIGH 50: CPT | Performed by: PSYCHIATRY & NEUROLOGY

## 2022-05-02 PROCEDURE — 250N000013 HC RX MED GY IP 250 OP 250 PS 637: Performed by: STUDENT IN AN ORGANIZED HEALTH CARE EDUCATION/TRAINING PROGRAM

## 2022-05-02 PROCEDURE — H2032 ACTIVITY THERAPY, PER 15 MIN: HCPCS

## 2022-05-02 PROCEDURE — 250N000013 HC RX MED GY IP 250 OP 250 PS 637: Performed by: PSYCHIATRY & NEUROLOGY

## 2022-05-02 RX ADMIN — LAMOTRIGINE 200 MG: 200 TABLET ORAL at 08:23

## 2022-05-02 RX ADMIN — SERTRALINE 200 MG: 100 TABLET, FILM COATED ORAL at 08:23

## 2022-05-02 RX ADMIN — BUSPIRONE HYDROCHLORIDE 30 MG: 15 TABLET ORAL at 08:23

## 2022-05-02 RX ADMIN — ACETAMINOPHEN 650 MG: 325 TABLET ORAL at 05:23

## 2022-05-02 RX ADMIN — RISPERIDONE 1 MG: 1 TABLET ORAL at 20:43

## 2022-05-02 RX ADMIN — LEVOTHYROXINE SODIUM 150 MCG: 0.07 TABLET ORAL at 06:41

## 2022-05-02 RX ADMIN — TOPIRAMATE 200 MG: 50 TABLET ORAL at 20:44

## 2022-05-02 RX ADMIN — TOPIRAMATE 200 MG: 50 TABLET ORAL at 08:23

## 2022-05-02 RX ADMIN — NALTREXONE HYDROCHLORIDE 50 MG: 50 TABLET, FILM COATED ORAL at 20:44

## 2022-05-02 RX ADMIN — MELOXICAM 15 MG: 7.5 TABLET ORAL at 08:24

## 2022-05-02 RX ADMIN — BUSPIRONE HYDROCHLORIDE 30 MG: 15 TABLET ORAL at 20:44

## 2022-05-02 RX ADMIN — NALTREXONE HYDROCHLORIDE 50 MG: 50 TABLET, FILM COATED ORAL at 08:22

## 2022-05-02 RX ADMIN — Medication 1 MG: at 10:47

## 2022-05-02 RX ADMIN — BUPROPION HYDROCHLORIDE 150 MG: 150 TABLET, EXTENDED RELEASE ORAL at 08:23

## 2022-05-02 NOTE — PLAN OF CARE
NOC Shift Report     Pt in bed at beginning of shift, breathing quiet and unlabored. Pt slept through shift. Pt slept 5.5 hours.      No pt complaints or concerns at this time.      PRNs given. See MAR. Will continue to monitor.      Precautions: SI

## 2022-05-02 NOTE — PLAN OF CARE
Assessment/Intervention/Current Symtoms and Care Coordination  -Chart review  -Pre round meeting with team  -Rounded with team, addressed patient needs/concerns  -Post round meeting with team  Current Symptoms include the following: patient reports anxiety, had multiple physical somatic symptoms over weekend, he discussed these with unit provider Dr. Gunn and adjustments were made to medications.     WR met with patient in his room. Pt was sitting up on bed reading a book. Patient said that weekend was difficult with many physical discomforts, pt believed these were related to side affects of medications. Pt said that he was interested in an in person IOP and stated that his wife Ellie would also like to speak with WR about this. WR and patient also discussed possible benefit of family therapy. WR agreed to look into IOP's and contact patients wife     PM UPDATE:  WR left message for patient's wife Ellie [phone]. WR faxed referral information to [facility] for their Adult in person IOP at [place] location.         Discharge Plan or Goal  Patient will discharge home with appropriate outpatient plan and appointments in place.     Barriers to Discharge   Patient requires evaluation and stabilization of psychiatric symptoms.     Referral Status  Patient referred for [facility] IOP.     Legal Status  voluntary

## 2022-05-02 NOTE — PROGRESS NOTES
"[facility], [place]   Psychiatric Progress Note  Hospital Day: 4        Interim History:   The patient's care was discussed with the treatment team during the daily team meeting and/or staff's chart notes were reviewed.  Staff report patient mostly kept to self over weekend, visible for meals, taking medications as prescribed, denying further SI, having some muscle twitching and nerve pain he has experienced before, did not take AM Abilify today in case it was side effect, no additional concerns.     Upon interview, the patient reviewed how he felt over the weekend, he states at some points he felt like he \"had too much espresso\" and needing to \"just go\" and then had some twitching in his triceps and some nerve pain/tingling and itching around his neck and over his head. He has had this before not medication related. Also discussed some of this may be due to hospital clothing, detergent, he plans to change into own clothes to see if this helps. Discussed some of his hyped up feelings may be akathisia and will reduce aripiprazole dose and see if this improves, will consider alternatives as well in case pt does not tolerate decreased dose. He has not had any further SI over weekend. Reports his wife wanted a phone to call to discuss supports once he goes home and that he feels improved in hospital but once he goes home \"then whats next?\" He would be open to participating in a PHP and believes he would qualify for short term leave to do this. Writer discussed CTC would have more information and pt agrees for CTC to call wife and if there are additional questions writer will call wife as well. No additional concerns.          Medications:       ARIPiprazole  1 mg Oral Daily     buPROPion  150 mg Oral QAM     busPIRone  30 mg Oral BID     lamoTRIgine  200 mg Oral Daily     meloxicam  15 mg Oral Daily     naltrexone  50 mg Oral BID     risperiDONE  1 mg Oral At Bedtime     sertraline  200 mg " "Oral Daily     levothyroxine  150 mcg Oral QAM AC     testosterone cypionate  150 mg Intramuscular Q14 Days     topiramate  200 mg Oral BID          Allergies:     Allergies   Allergen Reactions     Latuda [Lurasidone] Nausea and Vomiting     Pt reports this reaction when taking Latuda in the past     Paxil [Paroxetine] Headache     Pt reports this reaction when taking this medication in the past.     Penicillins      Zithromax [Azithromycin Dihydrate]      Erythromycin Rash     Levothyroxine Rash     Only happens with generic - NEEDS BRAND NAME     Propylthiouracil Rash          Labs:     Recent Results (from the past 48 hour(s))   Glucose by meter    Collection Time: 04/30/22  5:45 PM   Result Value Ref Range    GLUCOSE BY METER POCT 110 (H) 70 - 99 mg/dL          Psychiatric Examination:     /75 (BP Location: Left arm, Patient Position: Chair, Cuff Size: Adult Large)   Pulse 68   Temp 97.8  F (36.6  C) (Oral)   Resp 16   Ht 1.778 m (5' 10\")   Wt 144.7 kg (319 lb 1.6 oz)   SpO2 97%   BMI 45.79 kg/m    Weight is 319 lbs 1.6 oz  Body mass index is 45.79 kg/m .    Orthostatic Vitals  Report      Most Recent      Sitting Orthostatic /75 05/02 0806    Sitting Orthostatic Pulse (bpm) 68 05/02 0806    Standing Orthostatic /75 05/02 0806    Standing Orthostatic Pulse (bpm) 70 05/02 0806        Appearance: awake, alert and adequately groomed  Attitude:  cooperative  Eye Contact:  good  Mood:  depressed, improving  Affect:  mood congruent and intensity is blunted  Speech:  clear, coherent and normal prosody  Language: fluent and intact in English  Psychomotor, Gait, Musculoskeletal:  no evidence of tardive dyskinesia, dystonia, or tics  Thought Process:  logical, linear and goal oriented  Associations:  no loose associations  Thought Content:  no evidence of suicidal ideation or homicidal ideation and no evidence of psychotic thought  Insight:  good  Judgement:  intact  Oriented to:  time, person, " and place  Attention Span and Concentration:  intact  Recent and Remote Memory:  intact  Fund of Knowledge:  appropriate    Clinical Global Impressions  First:  Considering your total clinical experience with this particular patient population, how severe are the patient's symptoms at this time?: 7 (04/29/22 1102)  Compared to the patient's condition at the START of treatment, this patient's condition is: 4 (04/29/22 1102)  Most recent:  Considering your total clinical experience with this particular patient population, how severe are the patient's symptoms at this time?: 7 (04/29/22 1102)  Compared to the patient's condition at the START of treatment, this patient's condition is: 4 (04/29/22 1102)           Precautions:     Behavioral Orders   Procedures     Code 1 - Restrict to Unit     Fall precautions     Routine Programming     As clinically indicated     Status 15     Every 15 minutes.     Status Individual Observation     Patient SIO status reviewed with team/RN.  Please also refer to RN/team documentation for add'l detail.    -SIO staff to monitor following which have contributed to patient being on SIO:  Suicidal ideation.  Has CPAP.  RN requesting that provider complete in-person assessment to determine whether he can maintain safety with CPAP.  -Possible interventions SIO staff could use to support patient's treatment progress:  Monitor to ensure safety.  -When following observed, team will review discontinuation of SIO:  Ability to contract for safety with CPAP     Order Specific Question:   CONTINUOUS 24 hours / day     Answer:   Other     Order Specific Question:   Specify distance     Answer:   10 feet, at door during night     Order Specific Question:   Indications for SIO     Answer:   Medical equipment / ligature risk     Order Specific Question:   Indications for SIO     Answer:   Suicide risk     Suicide precautions     Patients on Suicide Precautions should have a Combination Diet ordered that  "includes a Diet selection(s) AND a Behavioral Tray selection for Safe Tray - with utensils, or Safe Tray - NO utensils            Diagnoses:   Suicidal ideation   Bipolar disorder, type 2, current episode depressed  TITA  R/O PTSD  SUBHA on CPAP  Chronic Leukocytosis  Acute L knee medial meniscus tear and osteoarthritis  Obesity   Hypothyroidism  Mixed hyperlipidemia per history   Testicular hypofunction with low testosterone         Assessment & Plan:   Assessment and hospital summary:  This patient is a 52 year old male with history of bipolar disorder, anxiety and multiple medical comorbidites who presented to [facility] ED with SI in context of increased stressors including family dynamics. He was medically cleared and transferred to EMPATH unit for further observation, continued to report SI, PTA medications continued on EMPATH and arrangements made for patient to admit to 10N as voluntary patient. He was placed on SIO overnight due to CPAP use and ongoing SI. Current presentation consistent with severe episode of depression in context of BPAD2, it does not appear he has had psychosis history, he describes \"paranoia\" that appear to be very anxiety and trauma driven thoughts. Discussed R/B/A of tapering off of risperidone and adding aripiprazole to further promote mood stability, pt in agreement with this. Will continue other PTA medications without changes. Patient works closely with therapist and psychiatric provider as outpatient, goals for hospitalization are to undergo medication changes and have improvement in SI.      Inpatient psychiatric hospitalization is warranted at this time for safety, stabilization, and possible adjustment in medications.    Psychiatric treatment/inteventions:  Medications:   -decrease aripiprazole to 1mg daily due to concerns for possible side effects/akathisia   -continue PTA risperidone at decreased dose of 1mg at bedtime to cross taper with aripiprazole  -continue PTA buproprion XL " 150mg daily for mood (may consider dose increase in future)   -continue PTA bupsirone 15mg BID for anxiety   -continue PTA lamotrigine 200mg daily for mood stabilization (may consider dose increase)   -continue PTA sertraline 200mg daily for depression, anxiety and SI  -continue PTA lorazepam 1mg daily PRN for severe anxiety   -PRN hydroxyzine 25mg every 4 hours for anxiety   -PRN olanzapine 10mg TID PO or IM for agitation   -PRN trazodone 50mg at bedtime for sleep      -will place pharmacy liaison consult to determine coverage of alternative to aripiprazole such as Rexulti or Vraylar      Laboratory/Imaging: Utox collected Friday, positive for benzodiazepines (prescribed lorazepam)     Patient will be treated in therapeutic milieu with appropriate individual and group therapies as described.     Medical treatment/interventions:  Medical concerns:   1) SUBHA on CPAP  -pts wife bringing in home machine  -pt no longer having SI, will discontinue SIO overnight at this time as patient contracts for safety      2) Chronic Leukocytosis  -WBC and ANC elevated on admission as above  -chart review and pt report indicates this has been chronic for him  -vitals stable  -monitor for other signs of infection      3) Acute L knee medial meniscus tear and osteoarthritis  -pt reports plan to have surgery on 18th   -continue PTA meloxicam along with PRN acetaminophen  -will order PRN Voltaren gel QID for pain and patient has been using ice     4) Obesity   -continue PTA naltrexone and topiramate     5) Hypothyroidism  -TSH WNL on admission   -continue PTA Synthroid, pt reports he requires brand name and PETRA as gets rash with generic, received generic dose at [facility], minor rash on back that is improving   -continue to monitor     6) Mixed hyperlipidemia per chart  -Lipid panel on admission WNL  -follow up with PCP for ongoing monitoring      7) Testicular hypofunction with low testosterone  -continue injections every 2 weeks on Sunday  evenings, next due 5/8     This note was created by undersigned using a Dragon dictation system. All typing errors or contextual distortion are unintentional and software inherent.     Disposition Plan   Reason for ongoing admission: poses an imminent risk to self  Discharge location: home with family  Discharge Medications: not ordered  Follow-up Appointments: not scheduled  Legal Status: voluntary  Entered by: Yohana Gunn MD on 5/2/2022 at 3:05 PM

## 2022-05-02 NOTE — PLAN OF CARE
"Shift Summary  Mental Status  Visible in milieu. Isolative and withdrawn to self. Mood is calm with flat affect. Feels \"ok\". Denies suicidal/homicidal ideations, self injury behavior, racing thought as well as auditory and visual hallucinations. Denies beings depressed or anxious. No evidence of self harm. Feels ready to discharge tomorrow. Did not participate in any therapeutic group activity. Took scheuled medication with no difficulties. No side effect reported by pt or noted by this writer. See changes to Abilify dosages from 2.5 mg to 1 mg due to possible side effect.   Prn: none  Physical Status  Pt moves around with no difficulties. No psychomotor/gait abnormalities are noted.   Slept 5.5 hours last night.   Appetite adequate. Ate both breakfast and lunch.   No problem with bowel and bladder per pt.  Denies pain.   Today's Lab orders:  Sitting /75 pulse 68  Standing /75 pulse 70  Prn: none  Continue to monitor pt's status Q 15 minutes and stabilize the patient's symptoms with the use of medications and therapeutic programming.                               "

## 2022-05-02 NOTE — DISCHARGE INSTRUCTIONS
Behavioral Discharge Planning and Instructions    Summary: You were admitted on 2022  due to Bipolar II Disorder .  You were treated by Dr. Yohana Gunn and discharged on 2022 from [facility] to Home    Main Diagnosis: Bipolar disorder, type 2, current episode depressed    Health Care Follow-up:     You have been referred to [facility] Intensive Outpatient Program (IOP), they will be reaching out to you in the next day to conduct a brief assessment and will help determine the appropriate program track. You can learn more about their programming at [email or web address] or by [phone].    Appointment Date/Time: Friday May 13th at 4:30 PM, video call   Psychiatrist/Primary Care Giver: Rick Griffith, MSN, APRN-BC, RN-BC  Address: [facility]  [address] [place] [address]  Phone [phone]  Fax [phone] HUC PLEASE FAX AVS    Please follow up with your therapist:    Therapist: [facility]   [address], ,   [place], [place]   P) [phone]     Attend all scheduled appointments with your outpatient providers. Call at least 24 hours in advance if you need to reschedule an appointment to ensure continued access to your outpatient providers.     Suggestions for Family and Couples Counseling:    [facility]   [address]  [facility] [address]  [phone]    [facility]  [address], [address]  [facility] [address] [phone] Phone  [phone] Fax    [facility]  [place] Office 1  (Mailing Address):  [facility]  [address] [place] [address]    [place] Office 2:  [facility]  [address] [place] [address]    [place] Office:  [facility]  [address] [place] [address]  P: [phone]   F:  "[phone]    Hold Me Tight: Seven Conversations for a Lifetime of Love - Dr. Renteta Noel  Hold Me Tight: Your Guide to the Most Successful Approach to Building [place] Relationships - Dr. Renetta Noel  he Seven Principles for Making Marriage Work: A Practical Guide from the Country's Foremost Relationship Expert -Andrade Carolina Phd.    Major Treatments, Procedures and Findings:  You were provided with: a psychiatric assessment, assessed for medical stability, medication evaluation and/or management, group therapy, and milieu management    Symptoms to Report: feeling more aggressive, increased confusion, losing more sleep, mood getting worse, or thoughts of suicide    Early warning signs can include: increased depression or anxiety sleep disturbances increased thoughts or behaviors of suicide or self-harm  increased unusual thinking, such as paranoia or hearing voices    Safety and Wellness:  Take all medicines as directed.  Make no changes unless your doctor suggests them.      Follow treatment recommendations.  Refrain from alcohol and non-prescribed drugs.  Ask your support system to help you reduce your access to items that could harm yourself or others. If there is a concern for safety, call 911.    Resources:   Crisis Intervention: 787.978.1923 or 438-659-6538 (TTY: 566.610.1526).  Call anytime for help.  National [place] on Mental Illness (www.[place].vinny.org): 410.909.4740 or 917-396-7763.  [place] Association for Children's Mental Health (www.macmh.org): 487.669.1219.  National Suicide Prevention Line (www.mentalhealthmn.org): 045-768-VVAP (0831)  [facility] Crisis Response 922-783-5675  [facility] Crisis (COPE) Response - Adult 122 198-9098  Text 4 Life: txt \"LIFE\" to 87835 for immediate support and crisis intervention  Crisis text line: Text \"[place]\" to 007687. Free, confidential, 24/7.  Crisis Intervention: 454.739.7874 or 175-592-2949. Call anytime for help.   [facility] Mental Health Crisis Team " - Child: [phone]    General Medication Instructions:   See your medication sheet(s) for instructions.   Take all medicines as directed.  Make no changes unless your doctor suggests them.   Go to all your doctor visits.  Be sure to have all your required lab tests. This way, your medicines can be refilled on time.  Do not use any drugs not prescribed by your doctor.  Avoid alcohol.    Advance Directives:   Scanned document on file with [place]? No scanned doc  Is document scanned? No. Copy Requested.  Honoring Choices Your Rights Handout: Informed and given  Was more information offered? Pt declined    The Treatment team has appreciated the opportunity to work with you. If you have any questions or concerns about your recent admission, you can contact the unit which can receive your call 24 hours a day, 7 days a week. They will be able to get in touch with a Provider if needed. The unit number is [phone].

## 2022-05-02 NOTE — CONSULTS
Consulted by Yohana Gunn to run a test claim for Rexulti tablets & Vraylar capsules.    Patient has primary pharmacy benefits through [employer] ([employer]). Per insurance, the following is covered under the plan:    +Rexulti tabs - $75, $15 after copay card  +Vraylar caps - $75, $5 after copay card    These copay cards are currently automatically applied electronically at the pharmacy. If home pharmcy is not set up for electronic copay cards, patient can set up a copay card for meds via the following links:    Rexulti: [email or web address]  Vraylar: [email or web address] [employer] [email or web address]      Please feel free to contact me with any other test claims, prior authorizations, or insurance questions regarding outpatient medications.     Thanks!      Adela Harding CPhT  [place] Discharge Pharmacy Liaison  Pronouns: She/Her/Hers    [facility]  [address] [place] [address] [address] [place] [address]   [email or web address]  [email or web address]   Phone: [phone]  Pager: [phone]  Fax: [phone]=

## 2022-05-03 VITALS
HEART RATE: 68 BPM | DIASTOLIC BLOOD PRESSURE: 70 MMHG | HEIGHT: 70 IN | OXYGEN SATURATION: 97 % | RESPIRATION RATE: 16 BRPM | SYSTOLIC BLOOD PRESSURE: 105 MMHG | BODY MASS INDEX: 45.1 KG/M2 | WEIGHT: 315 LBS | TEMPERATURE: 98.5 F

## 2022-05-03 PROCEDURE — 250N000013 HC RX MED GY IP 250 OP 250 PS 637: Performed by: PSYCHIATRY & NEUROLOGY

## 2022-05-03 PROCEDURE — 250N000013 HC RX MED GY IP 250 OP 250 PS 637: Performed by: STUDENT IN AN ORGANIZED HEALTH CARE EDUCATION/TRAINING PROGRAM

## 2022-05-03 PROCEDURE — 99239 HOSP IP/OBS DSCHRG MGMT >30: CPT | Performed by: PSYCHIATRY & NEUROLOGY

## 2022-05-03 PROCEDURE — 250N000013 HC RX MED GY IP 250 OP 250 PS 637

## 2022-05-03 RX ORDER — ARIPIPRAZOLE 2 MG/1
TABLET ORAL
Qty: 27 TABLET | Refills: 0 | Status: SHIPPED | OUTPATIENT
Start: 2022-05-04 | End: 2022-06-03

## 2022-05-03 RX ORDER — RISPERIDONE 2 MG/1
TABLET ORAL
COMMUNITY
Start: 2022-05-03 | End: 2022-05-11 | Stop reason: ALTCHOICE

## 2022-05-03 RX ADMIN — Medication 1 MG: at 08:00

## 2022-05-03 RX ADMIN — NALTREXONE HYDROCHLORIDE 50 MG: 50 TABLET, FILM COATED ORAL at 07:59

## 2022-05-03 RX ADMIN — LEVOTHYROXINE SODIUM 150 MCG: 0.07 TABLET ORAL at 08:01

## 2022-05-03 RX ADMIN — BUPROPION HYDROCHLORIDE 150 MG: 150 TABLET, EXTENDED RELEASE ORAL at 08:00

## 2022-05-03 RX ADMIN — LAMOTRIGINE 200 MG: 200 TABLET ORAL at 07:59

## 2022-05-03 RX ADMIN — TOPIRAMATE 200 MG: 50 TABLET ORAL at 07:59

## 2022-05-03 RX ADMIN — SERTRALINE 200 MG: 100 TABLET, FILM COATED ORAL at 08:00

## 2022-05-03 RX ADMIN — MELOXICAM 15 MG: 7.5 TABLET ORAL at 08:00

## 2022-05-03 RX ADMIN — BUSPIRONE HYDROCHLORIDE 30 MG: 15 TABLET ORAL at 08:00

## 2022-05-03 NOTE — PLAN OF CARE
48 Hours Nursing Assessment/Discharge note  Shift Summary: Pt alert and oriented x 3. Presents polite and cooperative. Able to communicate needs. Speech is clear and coherent. Affect is flat and mood is calm .Poor concentrations. Insight and judgement are fair. Hopeful for future. Visible in milieu and social with other patient. Appetite adequate. Pt is medication compliance. Slept 7.0 hours last night.     Discharged today. All discharge medications and instructions were reviewed with pt. Copy of discharge instruction and unit address/phone number given to pt. Walking, escorted down stairs and transferred to car safely.     At the time of discharge, pt denied any SI, SIB, HI, hallucination, racing thought, suicidal or homicidal ideations. No evidence of psychosis or paranoid/delusional thoughts. Endorses both depression and anxiety. Rated depression 5/10 & anxiety 3/10. Pt denies access to guns. Denies feeling hopeless or helpless. Pt is determined to not be an immediate danger to himself or others.   Discharge medication: given to pt from [place] [facility]  Discharge palace: home  Transportation: wife's car  Outcome: Progressing  Prn Medication given/Reason/effectiveness: None  Pain: Denies   Sensitivity/side effect: Tolerating medications well. No side effect reported by pt or noted by this writer.  Valuable: Given to pt from security

## 2022-05-03 NOTE — PLAN OF CARE
NOC Shift Report     Pt in bed at beginning of shift, breathing quiet and unlabored. Pt slept through shift. Pt slept 6.75 hours.      No pt complaints or concerns at this time.      No PRNs given. Will continue to monitor.     Goal Outcome Evaluation:   Problem: Sleep Disturbance  Goal: Adequate Sleep/Rest  Outcome: Ongoing, Progressing

## 2022-05-03 NOTE — PLAN OF CARE
Assessment/Intervention/Current Symtoms and Care Coordination  -Chart review  -Pre round meeting with team  -Rounded with team, addressed patient needs/concerns  -Post round meeting with team  Current Symptoms include the following: staff report patient is denying depression and anxiety.    WR spoke with patients wife Ellie, updated her on referral to [facility] program, explained what to expect. Wife had several concerns about struggles with their children and how they family interacts together,  mentioned it may be an option for for family to meet with the children's psychiatrist. WR encouraged this. WR also suggested couples or family counseling with a provider who uses [facility], agreed to list a few options for this on AVS. Wife said she would send paperwork by email for patients FMLA, also asked that patient have a note for his work.     WR met with patient, discussed plans for [facility], WR let him know what to expect. Patient signed MARIELY for ACP, WR called and confirmed patients follow up appointment with psychiatric provider, still AVS for details. WR provided patient with letter signed by doctor for his work. WR provided brief psycho education on the benefits of couples/family therapy. Pt was in agreement about this, seemed hopeful that IOP and continued therapy services would help him with his mental health struggles.     Discharge Plan or Goal  Patient will discharge home this day, patient has appointment scheduled with established providers, list of suggestions for couples/family counseling, and an active referral for [facility].     Barriers to Discharge   No barriers anticipated this day.     Referral Status  Patient referred for [facility], see AVS for details.     Legal Status  voluntary
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
## 2022-05-03 NOTE — PROGRESS NOTES
05/02/22 2100   Group Therapy Session   Group Attendance attended group session   Time Session Began 1600   Time Session Ended 1650   Total Time patient participated (minutes) 50   Total # Attendees 6   Group Type recreation   Group Topic Covered leisure exploration/use of leisure time   Group Session Detail TR leisure group   Patient Response/Contribution cooperative with task   Patient Response Detail Pt attended the structured Therapeutic Recreation group, participating in a group activity. Pt participated in group discussion and leisure participation as a healthy outlet to gain self-esteem, manage behaviors, improve social skills, and decrease isolation.  Pt remained focused and engaged throughout group activity.  Pt mood was sociable and was appropriate with interactions, contributing to the clues and descriptions throughout the activity.

## 2022-05-03 NOTE — PLAN OF CARE
Pt has been visible in the milieu this evening. Pt attended afternoon psychotherapy group. Pt affect flat but brightens upon approach. Pt continues to deny SI/SIB thoughts, urges or plan. Pt states he is glad with being referred to in person IOP program and his wife is happy with this too. Pt states he is still nervous about stressors at home, but glad he will have safe discharge plan in place and feels supported by wife. Pt denies experiencing any medication SE today and states he is relieved for this. Provider canceled SIO for CPAP at night. Pt was given his CPAP tonight to use independently and pt states he feels safe with this. Pt is medication compliant. Appetite and fluid intake adequate. VSS. Pt denies pain. No medication side effects reported or observed.       Writer spoke with pt and his wife Ellie during their visit this evening. Ellie states she did not receive a phone call from [facility] or provider today. Writer looked over notes from today and saw CTC left VM. Ellie says she did not get a VM. Writer double checked that we have the correct phone number and Ellie and Marc both confirmed that we do. I am unsure what happened here and apologized to them for miscommunication. Ellie still requesting a call from [facility] and provider.

## 2022-05-11 ENCOUNTER — VIRTUAL VISIT (OUTPATIENT)
Dept: PHARMACY | Facility: CLINIC | Age: 52
End: 2022-05-11
Payer: COMMERCIAL

## 2022-05-11 DIAGNOSIS — S83.209D CURRENT TEAR OF MENISCUS OF KNEE, UNSPECIFIED LATERALITY, UNSPECIFIED MENISCUS, UNSPECIFIED TEAR TYPE, SUBSEQUENT ENCOUNTER: ICD-10-CM

## 2022-05-11 DIAGNOSIS — F41.1 GAD (GENERALIZED ANXIETY DISORDER): ICD-10-CM

## 2022-05-11 DIAGNOSIS — E66.9 OBESITY, UNSPECIFIED CLASSIFICATION, UNSPECIFIED OBESITY TYPE, UNSPECIFIED WHETHER SERIOUS COMORBIDITY PRESENT: ICD-10-CM

## 2022-05-11 DIAGNOSIS — E34.9 TESTOSTERONE DEFICIENCY: ICD-10-CM

## 2022-05-11 DIAGNOSIS — E03.9 HYPOTHYROIDISM, UNSPECIFIED TYPE: ICD-10-CM

## 2022-05-11 DIAGNOSIS — L85.3 DRY SKIN: ICD-10-CM

## 2022-05-11 DIAGNOSIS — Z78.9 TAKES DIETARY SUPPLEMENTS: ICD-10-CM

## 2022-05-11 DIAGNOSIS — F31.31 BIPOLAR AFFECTIVE DISORDER, CURRENTLY DEPRESSED, MILD (H): Primary | ICD-10-CM

## 2022-05-11 PROCEDURE — 99607 MTMS BY PHARM ADDL 15 MIN: CPT | Performed by: PHARMACIST

## 2022-05-11 PROCEDURE — 99605 MTMS BY PHARM NP 15 MIN: CPT | Performed by: PHARMACIST

## 2022-05-11 RX ORDER — MULTIVITAMIN,THERAPEUTIC
1 TABLET ORAL DAILY
COMMUNITY

## 2022-05-11 RX ORDER — CLINDAMYCIN PHOSPHATE 10 UG/ML
LOTION TOPICAL 2 TIMES DAILY PRN
COMMUNITY

## 2022-05-11 RX ORDER — ACETAMINOPHEN 500 MG
1000 TABLET ORAL 2 TIMES DAILY
COMMUNITY
End: 2023-09-08

## 2022-05-11 NOTE — PROGRESS NOTES
"Medication Therapy Management (MTM) Encounter    ASSESSMENT:                            Medication Adherence/Access: No issues identified    Mental Health (BPAD 2 & TITA): stopped risperidone last night and increased dose of aripiprazole 2mg this morning, no issues with this medication thus far (noticed \"jitteriness\" when initially starting 2.5mg daily in the hospital). Has noticed some improvement in mood and distress tolerance with starting aripiprazole. Psychiatric medications managed by Rick POOL and scheduled for follow-up tomorrow at 845, completed first intake for [facility] and has follow-up intake scheduled for 5/19. As he has close follow-up and no adverse effects reported, no changes will be recommended today, will offer follow-up in 4 weeks.     Hypothyroidism: Stable. Last TSH is within normal limits. Continue management per Dr. Calixto.    Low Testosterone: Stable. Continue management per Dr. Calixto.    Obesity: Stable with 35lb weight loss over last 16 months on bupropion 150mg + naltrexone 50mg BID. Continue management per Dr. Calixto.    Left knee pain: Scheduled for surgical repair of left meniscal tear on 5/18, plans to stop taking acetaminophen and meloxicam following surgery.     Dry skin: Stable. Continue current meds.    Supplements: Vitamin D removed from med list. Recommended follow-up with PCP for possible repeat level to determine if continuing vitamin D supplement needed.     PLAN:                            1) No medication changes today  2) Follow-up with Rick Griffith for psychiatric management, tomorrow.  3) Follow-up with Marry on 5/19 for final screening for IOP.  4) Follow-up with PCP at next appointment about vitamin D supplementation/testing.     Follow-up: 4 weeks    SUBJECTIVE/OBJECTIVE:                          Sarath Mckeon is a 52 year old male called for a transitions of care visit. He was discharged from [facility] on 5/3/22 for suicidal " "ideation.      Reason for visit: Transition of Care visit.    Allergies/ADRs: Reviewed in chart  Past Medical History: Reviewed in chart  Tobacco: He reports that he has quit smoking. He does not have any smokeless tobacco history on file.  Alcohol: none    Medication Adherence/Access: no issues reported Fills weekly AM and PM pill box, never gets to the end of the week with left over medication.     Mental Health (BPAD 2 & TITA): Presented with suicidal ideation. Started and titrated to aripiprazole 2mg daily (started today, 5/11) and tapered off risperidone (last dose last night, 5/10). Continued lamotrigine 200mg daily, sertraline 200mg daily, buspirone 30mg BID, and lorazepam 1mg BID (attempted to increase dose but increased sedation).  Follow-up with Rick POOL scheduled for 5/12/22 @ 845 and referral to [facility] placed.     When initially starting aripiprazole 2.5mg noticed he had a \"itchy feeling\" and \"jittery feeling like I drank too much coffee so was decreased to 1mg daily and titrated up after 1 week. Has not noticed any itching since taking the first dose at 7AM, initial adverse reaction noted after 30 minutes.     Since discharge, mood \"pretty good\", stressors still there, but able to manage easier and has \"plans to remediate those.\" Has not had suicidal ideation since discharge.    Moved up initial appointment with Rick to make a trip to [place] with wife to visit in-laws over weekend. Completed phone screening for [facility], which set him up with another screening on 5/19 for possible placement into a program, hoped it could start sooner but is okay with waiting.     Hypothyroidism: Patient is taking Synthroid 150mcg daily (of note he must take the brand name as he had an allergic reaction, rash, to the generic), increased 9 months ago. Patient is not having symptoms of hypo/hyperthyroidism. DENIES dry skin, myalgias, muscle cramps, weakness, fatigue, weight gain, constipation, " "weight loss, nervousness, irritability, anxiety, insomnia and tachycardia.   TSH   Date Value Ref Range Status   04/29/2022 0.84 0.40 - 4.00 mU/L Final     Low Testosterone: testosterone cypionate 110mg injection Q 14 days. Now on ~110mg (\"I just kind of eyeball it\", because \"100 is too little and 150 is too much). 150mg was too high of a dose based on labs, did not notice adverse effects from this dose.    Obesity: Wellbutrin 150mg daily, naltrexone 50mg BID, and topiramate 200mg BID started about 16 months ago and lost 35lbs. Managed by Rico Calixto MD (endocrinologist). Denied any side effects or other med concerns.    Left knee pain: acetaminophen 1000mg BID, meloxicam 15mg daily prn, Osteobiflex (2 tablets of \"whatever is on sale\"), and ice. Feels that pain is managed with current regimen and denied side effects. Surgery scheduled for Wednesday 5/18/22 to repair torn meniscus.    Dry skin: clindamycin phosphate 1% cream (applies to cracks on feet), urea 20% cream BID (applies to skins). Works well and no concerns.    Supplements: Mens multivitamin, Vitamin D - not taking for 1 month (previously recommended to take for COVID), last checked through [facility] on 8/24/20. Of note, Osteobiflex has Vitamin D 400 units per tablet.    ----------------  Post Discharge Medication Reconciliation Status: discharge medications reconciled, continue medications without change.    I spent 45 minutes with this patient today. A copy of the visit note was provided to the patient's provider(s).    The patient declined a summary of these recommendations.    Rogelio Muro MD   Psychiatry Resident    Vaishnavi Zamudio PharmD  Medication Therapy Management Pharmacist  [facility]     Telemedicine Visit Details  Type of service:  Telephone visit  Start Time: 8:30 AM  End Time: 9:15 AM  Originating Location (patient location): Home  Distant Location (provider location):  [facility] " [facility]     Medication Therapy Recommendations  No medication therapy recommendations to display

## 2022-05-13 NOTE — PATIENT INSTRUCTIONS
"Recommendations from today's MTM visit:                                                    MTM (medication therapy management) is a service provided by a clinical pharmacist designed to help you get the most of out of your medicines.   Today we reviewed what your medicines are for, how to know if they are working, that your medicines are safe and how to make your medicine regimen as easy as possible.      1) No medication changes today  2) Follow-up with Rick Griffith for psychiatric management, tomorrow.  3) Follow-up with Marry on 5/19 for final screening for IOP.  4) Follow-up with PCP at next appointment about vitamin D supplementation/testing.     Follow-up: 4 weeks    It was great speaking with you today.  I value your experience and would be very thankful for your time in providing feedback in our clinic survey. In the next few days, you may receive an email or text message from [email or web address] with a link to a survey related to your  clinical pharmacist.\"     To schedule another MTM appointment, please call the clinic directly or you may call the MTM scheduling line at [phone] or toll-free at [phone].     My Clinical Pharmacist's contact information:                                                      Please feel free to contact me with any questions or concerns you have.      Vaishnavi Zamudio, PharmD  Medication Therapy Management Pharmacist  [facility]  "

## 2022-07-15 ENCOUNTER — HOSPITAL ENCOUNTER (EMERGENCY)
Facility: CLINIC | Age: 52
Discharge: HOME | End: 2022-07-15
Payer: COMMERCIAL

## 2022-07-15 VITALS
RESPIRATION RATE: 16 BRPM | TEMPERATURE: 98 F | DIASTOLIC BLOOD PRESSURE: 84 MMHG | SYSTOLIC BLOOD PRESSURE: 157 MMHG | OXYGEN SATURATION: 98 % | HEART RATE: 90 BPM

## 2022-07-15 NOTE — ED TRIAGE NOTES
Pt reports that he had surgery on his L knee on June 18th. Pt states that he has been doing PT through [facility]. States that knee pain has become much worse in the last week. Has tried tylenol and ibuprofen. Pt states that he emailed his surgeon and was told to take ibuprofen and follow up in clinic next week. ABC intact. Denies fevers. Pt does not think the knee is infected. He believes that he re-injured the knee.

## 2022-08-24 ENCOUNTER — HOSPITAL ENCOUNTER (EMERGENCY)
Facility: CLINIC | Age: 52
Discharge: HOME | End: 2022-08-24
Attending: EMERGENCY MEDICINE | Admitting: EMERGENCY MEDICINE
Payer: COMMERCIAL

## 2022-08-24 ENCOUNTER — APPOINTMENT (OUTPATIENT)
Dept: GENERAL RADIOLOGY | Facility: CLINIC | Age: 52
End: 2022-08-24
Attending: EMERGENCY MEDICINE
Payer: COMMERCIAL

## 2022-08-24 VITALS
TEMPERATURE: 97.8 F | HEART RATE: 78 BPM | RESPIRATION RATE: 18 BRPM | DIASTOLIC BLOOD PRESSURE: 99 MMHG | OXYGEN SATURATION: 98 % | SYSTOLIC BLOOD PRESSURE: 137 MMHG

## 2022-08-24 DIAGNOSIS — M25.512 ACUTE PAIN OF LEFT SHOULDER: ICD-10-CM

## 2022-08-24 DIAGNOSIS — M54.12 CERVICAL RADICULOPATHY: ICD-10-CM

## 2022-08-24 PROCEDURE — 99283 EMERGENCY DEPT VISIT LOW MDM: CPT

## 2022-08-24 PROCEDURE — 73030 X-RAY EXAM OF SHOULDER: CPT | Mod: LT

## 2022-08-24 RX ORDER — GABAPENTIN 100 MG/1
100 CAPSULE ORAL 3 TIMES DAILY
Qty: 30 CAPSULE | Refills: 0 | Status: SHIPPED | OUTPATIENT
Start: 2022-08-24 | End: 2023-09-08

## 2022-08-24 RX ORDER — METHYLPREDNISOLONE 4 MG
TABLET, DOSE PACK ORAL
Qty: 21 TABLET | Refills: 0 | Status: SHIPPED | OUTPATIENT
Start: 2022-08-24 | End: 2023-09-08

## 2022-08-24 ASSESSMENT — ENCOUNTER SYMPTOMS
NECK PAIN: 0
ARTHRALGIAS: 1
MYALGIAS: 1

## 2022-08-24 ASSESSMENT — ACTIVITIES OF DAILY LIVING (ADL): ADLS_ACUITY_SCORE: 35

## 2022-08-24 NOTE — ED PROVIDER NOTES
History   Chief Complaint:  Shoulder Pain    The history is provided by the patient.      Sarath Mckeon is a 52 year old male with history of hyperlipidemia, anxiety, neck pain, and obesity who presents with shoulder pain. The patient reports experiencing worsening left shoulder pain for 6 days after holding a plank position while doing yoga. States that his pain has a specific origin and radiates down his arm into all 5 fingers when he moves his head down. Reports that he had a virtual visit regarding this pain and was prescribed Flexeril with which he has not found relief. Adds that he has been alternating Advil and Tylenol at home without relief. Denies neck pain.    Review of Systems   Musculoskeletal: Positive for arthralgias and myalgias. Negative for neck pain.   All other systems reviewed and are negative.    Allergies:  Latuda [Lurasidone]  Paxil [Paroxetine]  Penicillins  Zithromax [Azithromycin Dihydrate]  Erythromycin  Levothyroxine  Propylthiouracil    Medications:  Aripiprazole  Bupropion  Buspirone  Lamotrigine  Lorazepam  Meloxicam  Naltrexone  Sertraline  Synthroid  Testosterone cypionate  Topiramate     Past Medical History:     Neck pain  DDD, cervical  Suicidal ideation  Leukocytosis  Testicular hypofunction  Hyperlipidemia  Low testosterone  Hypothyroidism  GERD  Anxiety  Acute medial meniscus tear of left knee  Primary osteoarthritis of knee, bilateral  Polyp of colon  Notalgia paresthetica  Monoclonal B-cell lymphocytosis  SUBHA  Obesity     Past Surgical History:    ACL reconstruction, right  Benign femoral tumor removal, left    Social History:  Presents alone  Presents via private vehicle     Physical Exam     Patient Vitals for the past 24 hrs:   BP Temp Temp src Pulse Resp SpO2   08/24/22 0547 (!) 137/99 97.8  F (36.6  C) Temporal 78 18 98 %     Physical Exam  General: Adult male sitting upright  Eyes: PERRL, Conjunctive within normal limits  CV:   Left radial pulse palpable.  Neck:  Nontender.  Normal active range of motion.  Resp: Normal respiratory effort.  MSK: Tender over the suprascapular region into the left trapezia and left posterior shoulder region.  No edema.  No crepitus.  Able to range the left shoulder but certain movements cause shooting pain down the left arm, reportedly.  Skin: Warm and dry. No rashes or lesions or ecchymoses on visible skin.  Neuro: Alert and oriented. Responds appropriately to all questions and commands. No focal findings appreciated. Normal muscle tone.  Sensation intact to light touch over all dermatomes of the left upper extremity.  5 out of 5 finger abduction, thumb opposition and wrist extension strength left upper extremity.  Psych: Normal mood and affect. Pleasant.    Emergency Department Course   Imaging:  XR Shoulder Left G/E 3 Views   Final Result   IMPRESSION: The left glenohumeral and acromioclavicular joints are negative for fracture or dislocation.         Report per radiology      Emergency Department Course:  Reviewed:  I reviewed nursing notes, vitals, past medical history and Care Everywhere    Assessments:  0550 I obtained history and examined the patient as noted above.   I reassessed the patient and discussed findings of the xray. He denies any new concerns. He feels comfortable with the plan for discharge.     Disposition:  The patient was discharged to home.     Impression & Plan     Medical Decision Making:  Patient presents with shoulder pain and shooting pain down his left arm with involvement of all digits but most prominent in the middle digit and ring finger.  This occurred after a yoga move.  He is neurovascular intact.  I suspect cervical radiculopathy but he has no new/current neck pain.  He reports a history of arthritic disease in his neck.  No indication for immediate imaging of the neck or advanced imaging at this time.  X-ray of shoulder was obtained given his complaints of shoulder pain and reproducible tenderness over the  bony scapula/posterior shoulder. There are no other findings concerning for the above listed differential, including no chest pain, shortness of breath, nausea, vomiting, diaphoresis or other concerning cardiac symptoms.  He is likely having significant muscle spasm and a possibility of a pinched nerve is causing his symptoms as discussed.  Gabapentin and a Medrol Dosepak seem reasonable.  He felt comfortable this plan was discharged home in stable condition.  He should follow-up with his PCP within this week with ongoing symptoms.  He reports outpatient EMG is already been ordered by his physician.  He can return anytime should he develop worsening of symptoms.  All questions answered prior to discharge      Diagnosis:    ICD-10-CM    1. Acute pain of left shoulder  M25.512    2. Cervical radiculopathy  M54.12        Discharge Medications:  New Prescriptions    GABAPENTIN (NEURONTIN) 100 MG CAPSULE    Take 1 capsule (100 mg) by mouth 3 times daily    METHYLPREDNISOLONE (MEDROL DOSEPAK) 4 MG TABLET THERAPY PACK    Follow Package Directions       Scribe Disclosure:  Yohana SOSA, am serving as a scribe at 5:50 AM on 8/24/2022 to document services personally performed by Leslee Mandujano MD based on my observations and the provider's statements to me.          Leslee Mandujano MD  08/29/22 1010

## 2022-08-24 NOTE — ED TRIAGE NOTES
Pt to ER with c/o left shoulder and neck pain, pt has had this prob before, has scheduled EMG test, pain too intense

## 2022-10-23 ENCOUNTER — HEALTH MAINTENANCE LETTER (OUTPATIENT)
Age: 52
End: 2022-10-23

## 2023-02-01 ENCOUNTER — APPOINTMENT (OUTPATIENT)
Dept: URBAN - METROPOLITAN AREA CLINIC 253 | Age: 53
Setting detail: DERMATOLOGY
End: 2023-02-04

## 2023-02-01 VITALS — HEIGHT: 70 IN | WEIGHT: 315 LBS

## 2023-02-01 DIAGNOSIS — D22 MELANOCYTIC NEVI: ICD-10-CM

## 2023-02-01 DIAGNOSIS — Z71.89 OTHER SPECIFIED COUNSELING: ICD-10-CM

## 2023-02-01 DIAGNOSIS — D18.0 HEMANGIOMA: ICD-10-CM

## 2023-02-01 DIAGNOSIS — L70.0 ACNE VULGARIS: ICD-10-CM

## 2023-02-01 DIAGNOSIS — L98.8 OTHER SPECIFIED DISORDERS OF THE SKIN AND SUBCUTANEOUS TISSUE: ICD-10-CM

## 2023-02-01 DIAGNOSIS — L24 IRRITANT CONTACT DERMATITIS: ICD-10-CM

## 2023-02-01 DIAGNOSIS — L82.1 OTHER SEBORRHEIC KERATOSIS: ICD-10-CM

## 2023-02-01 DIAGNOSIS — B35.3 TINEA PEDIS: ICD-10-CM

## 2023-02-01 PROBLEM — L24.9 IRRITANT CONTACT DERMATITIS, UNSPECIFIED CAUSE: Status: ACTIVE | Noted: 2023-02-01

## 2023-02-01 PROBLEM — D22.5 MELANOCYTIC NEVI OF TRUNK: Status: ACTIVE | Noted: 2023-02-01

## 2023-02-01 PROBLEM — D18.01 HEMANGIOMA OF SKIN AND SUBCUTANEOUS TISSUE: Status: ACTIVE | Noted: 2023-02-01

## 2023-02-01 PROBLEM — D22.71 MELANOCYTIC NEVI OF RIGHT LOWER LIMB, INCLUDING HIP: Status: ACTIVE | Noted: 2023-02-01

## 2023-02-01 PROBLEM — D22.72 MELANOCYTIC NEVI OF LEFT LOWER LIMB, INCLUDING HIP: Status: ACTIVE | Noted: 2023-02-01

## 2023-02-01 PROCEDURE — OTHER MIPS QUALITY: OTHER

## 2023-02-01 PROCEDURE — OTHER COUNSELING: OTHER

## 2023-02-01 PROCEDURE — OTHER PATIENT SPECIFIC COUNSELING: OTHER

## 2023-02-01 PROCEDURE — OTHER ADDITIONAL NOTES: OTHER

## 2023-02-01 PROCEDURE — 99213 OFFICE O/P EST LOW 20 MIN: CPT

## 2023-02-01 PROCEDURE — OTHER PRESCRIPTION: OTHER

## 2023-02-01 RX ORDER — CLOTRIMAZOLE 1 %
1% CREAM (GRAM) TOPICAL BID
Qty: 60 | Refills: 2 | Status: ERX | COMMUNITY
Start: 2023-02-01

## 2023-02-01 RX ORDER — UREA 20 %
20% CREAM (GRAM) TOPICAL BID
Qty: 85 | Refills: 1 | Status: ERX | COMMUNITY
Start: 2023-02-01

## 2023-02-01 RX ORDER — CLINDAMYCIN PHOSPHATE 10 MG/ML
1% LOTION TOPICAL QAM
Qty: 60 | Refills: 2 | Status: ERX | COMMUNITY
Start: 2023-02-01

## 2023-02-01 ASSESSMENT — LOCATION DETAILED DESCRIPTION DERM
LOCATION DETAILED: RIGHT MEDIAL PLANTAR HEEL
LOCATION DETAILED: RIGHT PROXIMAL PRETIBIAL REGION
LOCATION DETAILED: LEFT SUPERIOR MEDIAL UPPER BACK
LOCATION DETAILED: LEFT DISTAL POSTERIOR THIGH
LOCATION DETAILED: LEFT ANTERIOR DISTAL THIGH
LOCATION DETAILED: STERNUM
LOCATION DETAILED: LEFT PROXIMAL PRETIBIAL REGION
LOCATION DETAILED: RIGHT MID-UPPER BACK
LOCATION DETAILED: LEFT LATERAL PLANTAR HEEL
LOCATION DETAILED: LEFT PROXIMAL DORSAL FOREARM
LOCATION DETAILED: RIGHT DISTAL POSTERIOR THIGH
LOCATION DETAILED: RIGHT MEDIAL SUPERIOR CHEST
LOCATION DETAILED: LEFT SUPERIOR MEDIAL MIDBACK
LOCATION DETAILED: EPIGASTRIC SKIN

## 2023-02-01 ASSESSMENT — LOCATION SIMPLE DESCRIPTION DERM
LOCATION SIMPLE: RIGHT UPPER BACK
LOCATION SIMPLE: LEFT THIGH
LOCATION SIMPLE: LEFT POSTERIOR THIGH
LOCATION SIMPLE: LEFT FOREARM
LOCATION SIMPLE: RIGHT POSTERIOR THIGH
LOCATION SIMPLE: LEFT PRETIBIAL REGION
LOCATION SIMPLE: ABDOMEN
LOCATION SIMPLE: RIGHT PRETIBIAL REGION
LOCATION SIMPLE: LEFT PLANTAR SURFACE
LOCATION SIMPLE: CHEST
LOCATION SIMPLE: RIGHT PLANTAR SURFACE
LOCATION SIMPLE: LEFT LOWER BACK
LOCATION SIMPLE: LEFT UPPER BACK

## 2023-02-01 ASSESSMENT — LOCATION ZONE DERM
LOCATION ZONE: FEET
LOCATION ZONE: LEG
LOCATION ZONE: TRUNK
LOCATION ZONE: ARM

## 2023-02-01 NOTE — PROCEDURE: ADDITIONAL NOTES
Render Risk Assessment In Note?: no
Additional Notes: - Recommended utilizing Triamcinolone cream onto affected areas on the left forearm and lower legs bid until resolve but no more than 14 days per month.
Detail Level: Zone

## 2023-02-01 NOTE — PROCEDURE: COUNSELING
Spironolactone Pregnancy And Lactation Text: This medication can cause feminization of the male fetus and should be avoided during pregnancy. The active metabolite is also found in breast milk.
Tetracycline Counseling: Patient counseled regarding possible photosensitivity and increased risk for sunburn.  Patient instructed to avoid sunlight, if possible.  When exposed to sunlight, patients should wear protective clothing, sunglasses, and sunscreen.  The patient was instructed to call the office immediately if the following severe adverse effects occur:  hearing changes, easy bruising/bleeding, severe headache, or vision changes.  The patient verbalized understanding of the proper use and possible adverse effects of tetracycline.  All of the patient's questions and concerns were addressed. Patient understands to avoid pregnancy while on therapy due to potential birth defects.
Azelaic Acid Pregnancy And Lactation Text: This medication is considered safe during pregnancy and breast feeding.
High Dose Vitamin A Counseling: Side effects reviewed, pt to contact office should one occur.
Topical Retinoid Pregnancy And Lactation Text: This medication is Pregnancy Category C. It is unknown if this medication is excreted in breast milk.
Topical Sulfur Applications Pregnancy And Lactation Text: This medication is Pregnancy Category C and has an unknown safety profile during pregnancy. It is unknown if this topical medication is excreted in breast milk.
Include Pregnancy/Lactation Warning?: No
Detail Level: Detailed
Benzoyl Peroxide Counseling: Patient counseled that medicine may cause skin irritation and bleach clothing.  In the event of skin irritation, the patient was advised to reduce the amount of the drug applied or use it less frequently.   The patient verbalized understanding of the proper use and possible adverse effects of benzoyl peroxide.  All of the patient's questions and concerns were addressed.
Topical Sulfur Applications Counseling: Topical Sulfur Counseling: Patient counseled that this medication may cause skin irritation or allergic reactions.  In the event of skin irritation, the patient was advised to reduce the amount of the drug applied or use it less frequently.   The patient verbalized understanding of the proper use and possible adverse effects of topical sulfur application.  All of the patient's questions and concerns were addressed.
Erythromycin Counseling:  I discussed with the patient the risks of erythromycin including but not limited to GI upset, allergic reaction, drug rash, diarrhea, increase in liver enzymes, and yeast infections.
Detail Level: Zone
Tetracycline Pregnancy And Lactation Text: This medication is Pregnancy Category D and not consider safe during pregnancy. It is also excreted in breast milk.
Doxycycline Counseling:  Patient counseled regarding possible photosensitivity and increased risk for sunburn.  Patient instructed to avoid sunlight, if possible.  When exposed to sunlight, patients should wear protective clothing, sunglasses, and sunscreen.  The patient was instructed to call the office immediately if the following severe adverse effects occur:  hearing changes, easy bruising/bleeding, severe headache, or vision changes.  The patient verbalized understanding of the proper use and possible adverse effects of doxycycline.  All of the patient's questions and concerns were addressed.
Birth Control Pills Counseling: Birth Control Pill Counseling: I discussed with the patient the potential side effects of OCPs including but not limited to increased risk of stroke, heart attack, thrombophlebitis, deep venous thrombosis, hepatic adenomas, breast changes, GI upset, headaches, and depression.  The patient verbalized understanding of the proper use and possible adverse effects of OCPs. All of the patient's questions and concerns were addressed.
Erythromycin Pregnancy And Lactation Text: This medication is Pregnancy Category B and is considered safe during pregnancy. It is also excreted in breast milk.
Bactrim Pregnancy And Lactation Text: This medication is Pregnancy Category D and is known to cause fetal risk.  It is also excreted in breast milk.
Isotretinoin Counseling: Patient should get monthly blood tests, not donate blood, not drive at night if vision affected, not share medication, and not undergo elective surgery for 6 months after tx completed. Side effects reviewed, pt to contact office should one occur.
Detail Level: Generalized
Birth Control Pills Pregnancy And Lactation Text: This medication should be avoided if pregnant and for the first 30 days post-partum.
Azelaic Acid Counseling: Patient counseled that medicine may cause skin irritation and to avoid applying near the eyes.  In the event of skin irritation, the patient was advised to reduce the amount of the drug applied or use it less frequently.   The patient verbalized understanding of the proper use and possible adverse effects of azelaic acid.  All of the patient's questions and concerns were addressed.
Bactrim Counseling:  I discussed with the patient the risks of sulfa antibiotics including but not limited to GI upset, allergic reaction, drug rash, diarrhea, dizziness, photosensitivity, and yeast infections.  Rarely, more serious reactions can occur including but not limited to aplastic anemia, agranulocytosis, methemoglobinemia, blood dyscrasias, liver or kidney failure, lung infiltrates or desquamative/blistering drug rashes.
Winlevi Counseling:  I discussed with the patient the risks of topical clascoterone including but not limited to erythema, scaling, itching, and stinging. Patient voiced their understanding.
Minocycline Counseling: Patient advised regarding possible photosensitivity and discoloration of the teeth, skin, lips, tongue and gums.  Patient instructed to avoid sunlight, if possible.  When exposed to sunlight, patients should wear protective clothing, sunglasses, and sunscreen.  The patient was instructed to call the office immediately if the following severe adverse effects occur:  hearing changes, easy bruising/bleeding, severe headache, or vision changes.  The patient verbalized understanding of the proper use and possible adverse effects of minocycline.  All of the patient's questions and concerns were addressed.
Spironolactone Counseling: Patient advised regarding risks of diarrhea, abdominal pain, hyperkalemia, birth defects (for female patients), liver toxicity and renal toxicity. The patient may need blood work to monitor liver and kidney function and potassium levels while on therapy. The patient verbalized understanding of the proper use and possible adverse effects of spironolactone.  All of the patient's questions and concerns were addressed.
Azithromycin Counseling:  I discussed with the patient the risks of azithromycin including but not limited to GI upset, allergic reaction, drug rash, diarrhea, and yeast infections.
Topical Clindamycin Counseling: Patient counseled that this medication may cause skin irritation or allergic reactions.  In the event of skin irritation, the patient was advised to reduce the amount of the drug applied or use it less frequently.   The patient verbalized understanding of the proper use and possible adverse effects of clindamycin.  All of the patient's questions and concerns were addressed.
Sarecycline Counseling: Patient advised regarding possible photosensitivity and discoloration of the teeth, skin, lips, tongue and gums.  Patient instructed to avoid sunlight, if possible.  When exposed to sunlight, patients should wear protective clothing, sunglasses, and sunscreen.  The patient was instructed to call the office immediately if the following severe adverse effects occur:  hearing changes, easy bruising/bleeding, severe headache, or vision changes.  The patient verbalized understanding of the proper use and possible adverse effects of sarecycline.  All of the patient's questions and concerns were addressed.
High Dose Vitamin A Pregnancy And Lactation Text: High dose vitamin A therapy is contraindicated during pregnancy and breast feeding.
Dapsone Counseling: I discussed with the patient the risks of dapsone including but not limited to hemolytic anemia, agranulocytosis, rashes, methemoglobinemia, kidney failure, peripheral neuropathy, headaches, GI upset, and liver toxicity.  Patients who start dapsone require monitoring including baseline LFTs and weekly CBCs for the first month, then every month thereafter.  The patient verbalized understanding of the proper use and possible adverse effects of dapsone.  All of the patient's questions and concerns were addressed.
Azithromycin Pregnancy And Lactation Text: This medication is considered safe during pregnancy and is also secreted in breast milk.
Tazorac Pregnancy And Lactation Text: This medication is not safe during pregnancy. It is unknown if this medication is excreted in breast milk.
Doxycycline Pregnancy And Lactation Text: This medication is Pregnancy Category D and not consider safe during pregnancy. It is also excreted in breast milk but is considered safe for shorter treatment courses.
Detail Level: Simple
Benzoyl Peroxide Pregnancy And Lactation Text: This medication is Pregnancy Category C. It is unknown if benzoyl peroxide is excreted in breast milk.
Dapsone Pregnancy And Lactation Text: This medication is Pregnancy Category C and is not considered safe during pregnancy or breast feeding.
Topical Clindamycin Pregnancy And Lactation Text: This medication is Pregnancy Category B and is considered safe during pregnancy. It is unknown if it is excreted in breast milk.
Winlevi Pregnancy And Lactation Text: This medication is considered safe during pregnancy and breastfeeding.
Isotretinoin Pregnancy And Lactation Text: This medication is Pregnancy Category X and is considered extremely dangerous during pregnancy. It is unknown if it is excreted in breast milk.
Topical Retinoid counseling:  Patient advised to apply a pea-sized amount only at bedtime and wait 30 minutes after washing their face before applying.  If too drying, patient may add a non-comedogenic moisturizer. The patient verbalized understanding of the proper use and possible adverse effects of retinoids.  All of the patient's questions and concerns were addressed.
Aklief counseling:  Patient advised to apply a pea-sized amount only at bedtime and wait 30 minutes after washing their face before applying.  If too drying, patient may add a non-comedogenic moisturizer.  The most commonly reported side effects including irritation, redness, scaling, dryness, stinging, burning, itching, and increased risk of sunburn.  The patient verbalized understanding of the proper use and possible adverse effects of retinoids.  All of the patient's questions and concerns were addressed.
Tazorac Counseling:  Patient advised that medication is irritating and drying.  Patient may need to apply sparingly and wash off after an hour before eventually leaving it on overnight.  The patient verbalized understanding of the proper use and possible adverse effects of tazorac.  All of the patient's questions and concerns were addressed.
Aklief Pregnancy And Lactation Text: It is unknown if this medication is safe to use during pregnancy.  It is unknown if this medication is excreted in breast milk.  Breastfeeding women should use the topical cream on the smallest area of the skin for the shortest time needed while breastfeeding.  Do not apply to nipple and areola.

## 2023-06-01 ENCOUNTER — HEALTH MAINTENANCE LETTER (OUTPATIENT)
Age: 53
End: 2023-06-01

## 2023-08-09 ENCOUNTER — VIRTUAL VISIT (OUTPATIENT)
Dept: PSYCHIATRY | Facility: CLINIC | Age: 53
End: 2023-08-09
Payer: COMMERCIAL

## 2023-08-09 VITALS — HEIGHT: 70 IN | BODY MASS INDEX: 44.38 KG/M2 | WEIGHT: 310 LBS

## 2023-08-09 DIAGNOSIS — F31.32 BIPOLAR AFFECTIVE DISORDER, CURRENTLY DEPRESSED, MODERATE (H): Primary | ICD-10-CM

## 2023-08-09 RX ORDER — LITHIUM CARBONATE 300 MG/1
300 CAPSULE ORAL 3 TIMES DAILY
COMMUNITY

## 2023-08-09 RX ORDER — HYDROXYZINE HYDROCHLORIDE 10 MG/1
1 TABLET, FILM COATED ORAL PRN
COMMUNITY
Start: 2022-11-26 | End: 2023-09-08

## 2023-08-09 RX ORDER — CYCLOSPORINE 0.5 MG/ML
1 EMULSION OPHTHALMIC 2 TIMES DAILY
COMMUNITY

## 2023-08-09 ASSESSMENT — PAIN SCALES - GENERAL: PAINLEVEL: EXTREME PAIN (8)

## 2023-08-09 ASSESSMENT — PATIENT HEALTH QUESTIONNAIRE - PHQ9: SUM OF ALL RESPONSES TO PHQ QUESTIONS 1-9: 13

## 2023-08-09 NOTE — NURSING NOTE
Is the patient currently in the state of [place]? YES    Visit mode:VIDEO    If the visit is dropped, the patient can be reconnected by: VIDEO VISIT: Text to cell phone: [phone]    Will anyone else be joining the visit? NO      How would you like to obtain your AVS? MyChart    Are changes needed to the allergy or medication list? NO    Reason for visit: Consult    Violet GAMEZ
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
## 2023-08-16 ENCOUNTER — APPOINTMENT (OUTPATIENT)
Dept: URBAN - METROPOLITAN AREA CLINIC 253 | Age: 53
Setting detail: DERMATOLOGY
End: 2023-08-20

## 2023-08-16 VITALS — WEIGHT: 309 LBS | HEIGHT: 70 IN

## 2023-08-16 VITALS — WEIGHT: 309 LBS | HEIGHT: 70 IN | RESPIRATION RATE: 14 BRPM

## 2023-08-16 DIAGNOSIS — D18.0 HEMANGIOMA: ICD-10-CM

## 2023-08-16 DIAGNOSIS — L71.8 OTHER ROSACEA: ICD-10-CM

## 2023-08-16 DIAGNOSIS — D22 MELANOCYTIC NEVI: ICD-10-CM

## 2023-08-16 DIAGNOSIS — L82.1 OTHER SEBORRHEIC KERATOSIS: ICD-10-CM

## 2023-08-16 DIAGNOSIS — L81.4 OTHER MELANIN HYPERPIGMENTATION: ICD-10-CM

## 2023-08-16 DIAGNOSIS — D49.2 NEOPLASM OF UNSPECIFIED BEHAVIOR OF BONE, SOFT TISSUE, AND SKIN: ICD-10-CM

## 2023-08-16 DIAGNOSIS — R20.2 PARESTHESIA OF SKIN: ICD-10-CM

## 2023-08-16 PROBLEM — D22.39 MELANOCYTIC NEVI OF OTHER PARTS OF FACE: Status: ACTIVE | Noted: 2023-08-16

## 2023-08-16 PROBLEM — D22.5 MELANOCYTIC NEVI OF TRUNK: Status: ACTIVE | Noted: 2023-08-16

## 2023-08-16 PROBLEM — D22.62 MELANOCYTIC NEVI OF LEFT UPPER LIMB, INCLUDING SHOULDER: Status: ACTIVE | Noted: 2023-08-16

## 2023-08-16 PROBLEM — D18.01 HEMANGIOMA OF SKIN AND SUBCUTANEOUS TISSUE: Status: ACTIVE | Noted: 2023-08-16

## 2023-08-16 PROBLEM — D22.61 MELANOCYTIC NEVI OF RIGHT UPPER LIMB, INCLUDING SHOULDER: Status: ACTIVE | Noted: 2023-08-16

## 2023-08-16 PROCEDURE — OTHER PRESCRIPTION: OTHER

## 2023-08-16 PROCEDURE — 11102 TANGNTL BX SKIN SINGLE LES: CPT

## 2023-08-16 PROCEDURE — OTHER ADDITIONAL NOTES: OTHER

## 2023-08-16 PROCEDURE — OTHER PHOTO-DOCUMENTATION: OTHER

## 2023-08-16 PROCEDURE — 11103 TANGNTL BX SKIN EA SEP/ADDL: CPT

## 2023-08-16 PROCEDURE — OTHER MIPS QUALITY: OTHER

## 2023-08-16 PROCEDURE — OTHER BIOPSY BY SHAVE METHOD: OTHER

## 2023-08-16 PROCEDURE — OTHER COUNSELING: OTHER

## 2023-08-16 PROCEDURE — 99213 OFFICE O/P EST LOW 20 MIN: CPT | Mod: 25

## 2023-08-16 RX ORDER — METRONIDAZOLE 7.5 MG/G
0.75% CREAM TOPICAL BID
Qty: 45 | Refills: 4 | Status: ERX | COMMUNITY
Start: 2023-08-16

## 2023-08-16 ASSESSMENT — LOCATION SIMPLE DESCRIPTION DERM
LOCATION SIMPLE: RIGHT FOREHEAD
LOCATION SIMPLE: LEFT UPPER ARM
LOCATION SIMPLE: LEFT INFERIOR EYELID
LOCATION SIMPLE: CHEST
LOCATION SIMPLE: RIGHT UPPER BACK
LOCATION SIMPLE: RIGHT UPPER ARM
LOCATION SIMPLE: NOSE
LOCATION SIMPLE: RIGHT INFERIOR EYELID
LOCATION SIMPLE: LEFT CHEEK

## 2023-08-16 ASSESSMENT — LOCATION DETAILED DESCRIPTION DERM
LOCATION DETAILED: RIGHT INFERIOR MEDIAL UPPER BACK
LOCATION DETAILED: RIGHT MEDIAL UPPER BACK
LOCATION DETAILED: LEFT PROXIMAL POSTERIOR UPPER ARM
LOCATION DETAILED: RIGHT PROXIMAL POSTERIOR UPPER ARM
LOCATION DETAILED: NASAL SUPRATIP
LOCATION DETAILED: RIGHT DISTAL POSTERIOR UPPER ARM
LOCATION DETAILED: RIGHT INFERIOR FOREHEAD
LOCATION DETAILED: RIGHT INFERIOR UPPER BACK
LOCATION DETAILED: RIGHT LATERAL INFERIOR EYELID
LOCATION DETAILED: LEFT LATERAL INFERIOR CHEST
LOCATION DETAILED: LEFT CENTRAL MALAR CHEEK
LOCATION DETAILED: LEFT SUPERIOR LATERAL BUCCAL CHEEK
LOCATION DETAILED: LEFT INFERIOR CENTRAL MALAR CHEEK
LOCATION DETAILED: LEFT LATERAL INFERIOR EYELID
LOCATION DETAILED: STERNUM
LOCATION DETAILED: LEFT DISTAL POSTERIOR UPPER ARM
LOCATION DETAILED: STERNAL NOTCH

## 2023-08-16 ASSESSMENT — LOCATION ZONE DERM
LOCATION ZONE: EYELID
LOCATION ZONE: ARM
LOCATION ZONE: FACE
LOCATION ZONE: TRUNK
LOCATION ZONE: NOSE

## 2023-08-16 NOTE — PROCEDURE: ADDITIONAL NOTES
Additional Notes: Recommended patient use OTC topical Capsaicin. Additionally recommended patient stretch and massage this area.
Render Risk Assessment In Note?: no
Detail Level: Simple
Additional Notes: The patient reports they have lost a significant amount of weight. Reassured patient that this hyperpigmentation under the eyes is normal and can be due to the weight loss or increasing age.

## 2023-08-16 NOTE — HPI: SKIN LESION
What Type Of Note Output Would You Prefer (Optional)?: Standard Output
Is This A New Presentation, Or A Follow-Up?: Skin Lesions
Additional History: The patient reports they have oblong moles on the forearms and face that appeared in April or may. They report weird crescents under the eyes that are dark and have been there a couple months. Additionally, the patient reports their notalgia parestetica has been very bad this summer.

## 2023-08-16 NOTE — PROCEDURE: BIOPSY BY SHAVE METHOD

## 2023-08-30 ENCOUNTER — VIRTUAL VISIT (OUTPATIENT)
Dept: PSYCHIATRY | Facility: CLINIC | Age: 53
End: 2023-08-30
Payer: COMMERCIAL

## 2023-08-30 VITALS — BODY MASS INDEX: 43.95 KG/M2 | HEIGHT: 70 IN | WEIGHT: 307 LBS

## 2023-08-30 DIAGNOSIS — F31.32 BIPOLAR AFFECTIVE DISORDER, CURRENTLY DEPRESSED, MODERATE (H): Primary | ICD-10-CM

## 2023-08-30 ASSESSMENT — PAIN SCALES - GENERAL: PAINLEVEL: MODERATE PAIN (5)

## 2023-08-30 ASSESSMENT — PATIENT HEALTH QUESTIONNAIRE - PHQ9: SUM OF ALL RESPONSES TO PHQ QUESTIONS 1-9: 12

## 2023-08-30 NOTE — PROGRESS NOTES
Depression Response    Patient completed the PHQ-9 assessment for depression and scored >9? Yes  Question 9 on the PHQ-9 was positive for suicidality? Yes  Does patient have current mental health provider? Yes    Is this a virtual visit? Yes   Does patient have suicidal ideation (positive question 9)? No - offer to place Mental Health Referral.  Patient declined referral/not needed    I personally notified the following: visit provider        Virtual Visit Details    Type of service:  Video Visit     Originating Location (pt. Location): Home    Distant Location (provider location):  Off-site  Platform used for Video Visit: [employer]
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
## 2023-08-30 NOTE — NURSING NOTE
Patient reviewed medications and allergies in Mychart during e-check in and said everything looked correct.      Patient scored 12 on PHQ-9, #9 2=more than half the days.       Is the patient currently in the state of [place]? YES    Visit mode:VIDEO    If the visit is dropped, the patient can be reconnected by: VIDEO VISIT: Send to e-mail at: [email or web address]    Will anyone else be joining the visit? NO  (If patient encounters technical issues they should call [phone])    How would you like to obtain your AVS? MyChart    Are changes needed to the allergy or medication list? No    Reason for visit: Consult      Joan AGUILAR

## 2023-09-08 ENCOUNTER — OFFICE VISIT (OUTPATIENT)
Dept: PSYCHIATRY | Facility: CLINIC | Age: 53
End: 2023-09-08
Payer: COMMERCIAL

## 2023-09-08 VITALS — HEART RATE: 83 BPM | HEIGHT: 70 IN | WEIGHT: 306 LBS | BODY MASS INDEX: 43.81 KG/M2 | TEMPERATURE: 97.7 F

## 2023-09-08 DIAGNOSIS — F33.1 MAJOR DEPRESSIVE DISORDER, RECURRENT EPISODE, MODERATE (H): Primary | ICD-10-CM

## 2023-09-08 ASSESSMENT — PAIN SCALES - GENERAL: PAINLEVEL: SEVERE PAIN (7)

## 2023-09-08 NOTE — LETTER
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 feeling above that he does not have bipolar, this IS a reasonable and safe action, but, I do agree he should be monitored closely for roz.  - Consider alternate ADHD treatments; if you are concerned about that manic risk, it would be reasonable to trial Atomoxetine, or one of the alpha2-agonists.  - He has not yet tried Auvelity, and to the degree the anti-NMDA story is correct, it could specifically help his chronic suicidality.      -- Psychotherapy:   Continue regular individual psychotherapy     -- Procedures:    - rTMS is a reasonable next step.    - In the longer run, I may be willing to consider ketamine; it is a softer case, but suicidality may response.   - Patients who respond to medications but then lose their effect often do very well with VNS, which is something to consider once we exhaust less invasive options.    -- Referrals: rTMS       Care Team   Outpatient Prescriber: Outpatient Therapist: NEIL Psychiatrist: NEIL Therapist: LISET completed by: Pharmacist Consult:    Rick Sanon  N/a  Janette Armando 8/30/23    Formulation (primary and secondary factors guiding treatment plan):   Chief  concern: low mood , chronic suicidality   Primary diagnosis: major depressive disorder   Secondary diagnoses/factors: ASD, trauma    Treatment plan (What are the next 1-3 steps in treatment?)   Elements to consider:  Procedures (ECT, TMS, VNS): TMS   If TMS: Which coil? First available     Medications (ketamine, MAOI): no (but may do ketamine if TMS does not work)      Psychotherapy (BA, PE, CPT, DBT):   Are we recommending BA concurrent to a procedure/medication? No   Are we recommending another form of therapy?  No     Are we recommending concurrent/combination treatments?  No     How is waitlist affecting plan? It shouldn't affect plans      What ancillary supports/resources/therapies need to be organized by our team?   Does pt need an outpatient psychiatric prescriber?  No   Does pt need to establish with a therapist? No   Are we recommending a therapy not provided by our program (e.g. DBT, PE, CPT)?   No      Clinical Global Impression - Severity (at DA) / Improvement (at FU)   Considering your total clinical experience with this particular population, how severely ill is the patient at this time?  Score (1-7): 5   What is the plan and rough timeframe for completing care with TRD Program?   What is the primary endpoint/goal of treatment?  Improvement of suicidality    Timeframe for completion of this episode of TRD care? ~ 4-6months     Who will continue outpatient medication management?   Rick Griffith   Has this been communicated to pt?  Yes   Does a care coordination call with outpatient provider(s) need to be scheduled?      No   Will the pt need ongoing psychotherapy?    Yes  Will there be ongoing follow-up for ketamine?    No   Has lab monitoring been ordered? Not needed     Next appointment: pending TMS initiation process            CRISIS NUMBERS:   Provided routinely in AVS.    Treatment Risk Statement:  The patient understands the risks, benefits, adverse effects and alternatives. Agrees to treatment  with the capacity to do so. No medical contraindications to treatment. Agrees to call clinic for any problems. The patient understands to call 911 or go to the nearest ED if life threatening or urgent symptoms occur.          PROVIDER:  Kamilah Mckoy MD, PGY-2     Patient was seen and staffed with Dr. Lozano and Dr. Sanon.     Time based billing.  Time on day of service includes:  60min spent face to face with the patient   0 minutes pre-reviewing records  20 minutes preparing consultation note and follow up messages/documentation    Physician Attestation  I, Negro Sanon MD, saw this patient and agree with the findings and plan of care as documented in the note.      Items personally reviewed/procedural attestation: I was present for and supervised the entire  procedure.    Negro Sanon MD      Again, thank you for allowing me to participate in the care of your patient.      Sincerely,    Negro Sanon MD

## 2023-09-08 NOTE — PROGRESS NOTES
For the AVS    (TMS preauth boiler plate)     - there are also some medication ideas you have not yet tried:    . A stimulant or any other ADHD oriented medication, eg Strattera or clonidine or guanfacine. I have seen these relieve suicidality in patients with similar pictures.    . A new medication called Auvelity, which acts on different receptors than the others you have tried.         Make sure we suggest stimulant which can surprisingly help suicidal     Definitely worth considering Auvelity

## 2023-09-08 NOTE — PATIENT INSTRUCTIONS
Thank you for visiting our clinic     Today's Recommendations:  - Interventionally, the sequence of options worth considering are:  1) TMS, which we talked about extensively, and see also below.  2) I have not fully decided, but I do think ketamine would be worth us discussing further if TMS does not help.  3) In the long run, if the others do not work, we definteily would consider a vagus nerve stimulator (VNS); see below for more information. I do think you are a very strong candidate for responding to VNS.    - There are also some medication ideas you have not yet tried:    . A stimulant or any other ADHD oriented medication,  eg Strattera or clonidine or guanfacine. I have seen these relieve suicidality in patients with similar pictures.     . A new medication called Auvelity, which acts on different receptors than the others you have tried.           I will begin the process of authorization for TMS. This has two steps: getting approval from insurance and being on the wait list for TMS in our clinic.  The first step is two weeks in the simple case. If your insurance denies and we have to appeal, it could take months.  The second is waiting for an open treatment slot. When we have one, someone will call you. If you cannot take that specific time, you will go back on the top of the waiting list.    It is also possible to do TMS without insurance. The approximate cost would be $3500 for 15 sessions (a half course). In general, if you were not getting better by that point, we would stop. However, it is very common for 3 weeks to be a point at which you are partially but not totally better. We would recommend continuing to at least 30 sessions, which would be an additional cost.    It is perfectly OK to adjust medications or try other treatments while waiting. You may also find a TMS provider closer to you, and it is very much OK to get treatment through them.  You are always welcome to call or send a MyChart to ask  "about status updates.  The waiting process can be long and frustrating when you have this severe a symptom level. IF YOU ARE GETTING WORSE, CALL A CRISIS NUMBER (below) OR GO TO AN EMERGENCY ROOM. TMS is not an emergency treatment, and the first priority is keeping you stable while we are getting set up.    A standard TMS treatment course is 6 weeks of daily treatment, Monday through Friday. Treatments can take anywhere from 10 to 30 minutes, depending on the exact protocol we use.  Common side effects during the procedure and immediately after are a mild headache, dizziness, and a sense of tingling in your scalp. Most of these go away quite quickly, within an hour of treatment. It is possible to have a seizure during TMS, which is more common if you take certain medications. We carefully monitor and adjust dosing to prevent this, and in our clinic, the risk of a seizure is less than 1 in 10,000.    We monitor your progress throughout TMS treatment. If you are not improving after about 3 weeks, we will usually change to a slightly different TMS protocol. This can extend the treatment course beyond the usual 1.5 months.         We discussed the specific risks/benefits of rTMS treatment, including the common side effects (headache, mild anxiety, visual effects, dizziness) and the extremely rare possibility of a seizure.         - We have discussed that a vagus nerve stimulator (VNS) might be the next step in your treatment.     VNS has a tendency to work particularly well in patients who can respond to treatments, but who have trouble sustaining that response, e.g. who need frequent maintenance ECT or ketamine or TMS, or for whom medications work but then \"poop out\". I think you fit that pattern, which is one of the reasons behind this recommendation.   There is an emerging literature suggesting that VNS works very well for patients who have prior trauma or adverse childhood experiences that are driving their " "depression. My clinical experience bears this out -- I have seen patients become far more able to handle stressors in their life post-VNS. We do not know exactly why. Theories include changes in brain neurochemistry, alterations in how your brain interacts with your body, and the calming effect that VNS can have through its effects on the parasympathetic nervous system (which opposes the \"fight or flight\"  There are some suggestions that VNS also works well for patients who have comorbid chronic fatigue/fibromyalgia, POTS, and/or chronic pain syndromes. There is something about the hypersensitivity to bodily sensations in these disorders that might be mediated through the vagus nerve. This is one of the factors driving my recommendation.     VNS is a surgical treatment -- it involves a small stimulating electrode wrapped around a nerve in your neck.  The  website, with at least some patient outcomes and useful information, is:      https://[employer]/    The most critical primary article (jargon-y but if you want to see it) is: https://ajp.psychiatryonline.org/doi/10.1176/appi.ajp.2017.82985903    The big take-home points from that:  - VNS is what we would call an \"adjunctive\" treatment. It boosts the efficacy of other things and/or prevents relapse if we find something else that brings you to response. It usually does not work on its own.  - It is a surgical treatment, done as an outpatient surgery. It does not involve brain surgery, just nerves in the neck. Most people go home from that surgery the same day.  - Roughly 2/3 of people who get a VNS ultimately respond (have depressive improvement), but it often takes 6-12 months to get the benefit. That has included people who have not responded to anything else, including ECT. The chances are better for people who have responded to at least one other treatment, but there are no completely hopeless cases.  - The biggest side effects are voice changes " "(frequent, when the stimulator fires every five minutes) and pain in the throat/neck (temporary, fades within a week of dose adjustment). Some people experience blood pressure/heart rate changes or difficulty exercising, but there are ways to control the stimulator to prevent these from impairing your life.  - VNS insurance coverage is still variable. For Medicare, it requires enrolling in a clinical trial. Private insurers will pay for it about half the time. We do usually have to file an appeal or two, but we win most of our cases.  - VNS is intensive at first because we need to meet every two weeks to adjust the stimulator. Once it's adjusted, it becomes less frequent, and some patients only see us once a year for a battery check.  - It does require ongoing surgeries to change the battery, every six to eight years. These are uncomfortable, but relatively quick, some do not even need full anesthesia.      The next step is Seeing what happens with our other options such as TMS and medications; VNS is on the table for the future.                We are specifically a university and research clinic, and there are often research studies ongoing. Some of those are clinical trials of new brain stimulation treatments. Others are what we would call \"biomarker\" studies, where we ask you to participate in some kind of measurement while you are undergoing regular treatment.   If you are interested in hearing about research consider signing up for our research registry. This will allow researchers in our clinic to reach out if you become eligible for a study. Sign up today at [email or web address].[email or web address]_Registry    In some cases, a clinical trial is the only way to get access to an advanced treatment that is not covered by your insurance. Usually, we will talk about this in your visit if it is an option.      Patient Education       General Information:  Our Treatment-Resistant Disorders/Interventional Psychiatry clinic is what is " "often called a \"consultation\" or \"tertiary care\" clinic. That means we do not see patients long-term for medication management or talk therapy. We offer thorough evaluations, recommendations about medications/therapies you may have not yet tried, and in some cases, brain stimulation or office-based treatments. If you are likely to benefit from one of those advanced treatments, we will have talked about it today. Once that treatment is complete, we will see you once or twice afterwards to check in, and then you will return to getting ongoing psychiatric care from whomever you were seeing before you came for your evaluation with us. If for some reason you no longer have access to that clinician, we can help with a referral to our main [facility]. The only patients we see long-term are patients with implanted medical devices that require ongoing monitoring and programming.     Our recommendations almost always include some kind of cognitive-behavioral therapy (CBT) if you are not getting it already. Brain and nerve stimulation treatments work best when combined with certain talk therapies. We make these recommendations because we strongly believe that, without the therapy piece, most people will not get better, or will have limited clinical benefit. It is often difficult or inconvenient to add therapy to an already busy schedule, but it is also necessary.    It is important to remember that, like all mental health treatments, our interventional therapies are not perfect. About one third of people will not feel better after treatment, and even when they work, they do not take away the symptoms entirely.If we have recommended something above, it means we think there is a better than even chance of it working, but things are never guaranteed. This is another reason we usually recommend CBT along with our advanced treatments -- it can address the symptoms that remain after the stimulation/ketamine treatment. "       While we are waiting to implement the recommendations you and I have discussed, you should know what to do if your symptoms worsen. A variety of crisis numbers/resources are available. They include:    CRISIS GENERAL NUMBERS   3-402-CVHWQZM ([phone])  OR  911     CRISIS INTERVENTION TEAM (CIT) - this is a POLICE UNIT, specially trained.  This website has information for all of [place]'s CITs. Lays out which areas have this team.  [email or web address]  However, one can just call 911 and ask for this special team.   If police need to be called, DO ask for this team.    CRISIS MOBILE TEAMS  [and see end of this phrase*]  [facility] -COPE: 24hrs/7days:    [phone]  (Adults > 17yo)    [phone]  (Child   < 16yo)                                       FRONT DOOR (during the day could call)   [phone]    [facility] -[phone] (Adult)  [phone] (Child)     [facility] -[phone] (Adult and Child)     [facility] -[phone] ([facility] mobile crisis team; Adult and Child; 24hr)     [facility] -[phone] (Adult and Child)     CRISIS HOUSING  [facility]                           [address]              [phone]  [facility]                                [address]                       [phone]  [facility]                               [address]     [phone]   [facility]  [address]                [phone]    [place] EMERGENCY NUMBERS    Crisis Connection:                                [phone]  [facility]:     [phone]  Crisis Intervention:                                [phone] or [phone]   COPE: Mobile Team 24hrs/7days:    [phone]  (Adults > 17yo)                                                          "                   [phone]  (Child   < 18yo)  [facility]        [phone] 24/7 line and Mobile Team   [address] [place] [address]  DROP IN:  M-F: 8:00 am - 7:00 pm  Sat: 11:00 am - 3:00 pm   Sun: Closed     WALK-IN COUNSELING:  [facility]       [phone]    [address]:   M, W, F:  1:00-3:00PM    M-Th:  6:30-8:30PM    TRANS and LGBT  Call [employer] at [phone]. This service is staffed by trans people 24/7.   LGBT youth <24 contemplating suicide, call the [facility] [phone].    POISON CONTROL CENTER  [phone]    OR  go to nearest ER    CHILD  \"[facility] has a needs assessment team who will do an intake evaluation. Based on the results of the intake they will recommended inpatient admission, partial hospitalization, intensive outpatient or outpatient care. The number is [phone]. \"    CRISIS TEXT LINE  Http://www.crisistextline.org  FREE SUPPORT AT YOUR FINGERTIPS, 24/7  Crisis Text Line serves anyone, in any type of crisis, providing access to free, 24/7 support and information via the medium people already use and trust: text. Here s how it works:  1)  Text [phone] from anywhere in the [place], anytime, about any type of crisis.  2)  A live, trained Crisis Counselor receives the text and responds quickly.      The volunteer Crisis Counselor will help you move from a 'hot moment to a cool moment'    [facility] EMERGENCY NUMBERS    Crisis Connection:                                [phone]  [facility]:              [phone]  Crisis Intervention:                                [phone] or [phone]   COPE: Mobile Team 24hrs/7days:    [phone]  (Adults > 19yo)                                                                            [phone]  (Child   < 18yo)  [facility]        [phone]  CALL 24 hours a day.  [address] [place] [address]" "[place] [address]  DROP IN:  M-F: 8:00 am - 7:00 pm  Sat: 11:00 am - 3:00 pm   Sun: Closed    WALK-IN COUNSELIN)  [facility]                                  [phone]        [place], [address]:                  PRATIBHA, W:      5:00-7:00PM       2)  [facility]                            [phone]        [place], [address]                T, Th:      6:00-8:00PM    TRANS and LGBT  Call [employer] at [phone]. This service is staffed by trans people .   LGBT youth <24 contemplating suicide, call the [facility] [phone].    POISON CONTROL CENTER  [phone]    OR  go to nearest ER    CHILD  \"[facility] has a needs assessment team who will do an intake evaluation. Based on the results of the intake they will recommended inpatient admission, partial hospitalization, intensive outpatient or outpatient care. The number is [phone] or [phone]. \"    CRISIS TEXT LINE  Http://www.crisistextline.org  FREE SUPPORT AT YOUR FINGERTIPS,   Crisis Text Line serves anyone, in any type of crisis, providing access to free,  support and information via the medium people already use and trust: text. Here s how it works:  1)  Text 485-462 from anywhere in the [place], anytime, about any type of crisis.  2)  A live, trained Crisis Counselor receives the text and responds quickly.      The volunteer Crisis Counselor will help you move from a 'hot moment to a cool moment'      * A Community Paramedic (CP) is an advanced paramedic that works to increase access to primary and preventive care and decrease use of emergency departments, which in turn decreases health care costs. Among other things, CPs may play a key role in providing follow-up services after a hospital discharge to prevent hospital readmission. CPs can provide health assessments, chronic disease monitoring and education, medication management, immunizations and vaccinations, laboratory " specimen collection, hospital discharge follow-up care and minor medical procedures. CPs work under the direction of an Ambulance Medical Director.

## 2023-09-08 NOTE — PROGRESS NOTES
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 reasonable to trial Atomoxetine, or one of the alpha2-agonists.  - He has not yet tried Auvelity, and to the degree the anti-NMDA story is correct, it could specifically help his chronic suicidality.      -- Psychotherapy:   Continue regular individual psychotherapy     -- Procedures:    - rTMS is a reasonable next step.    - In the longer run, I may be willing to consider ketamine; it is a softer case, but suicidality may response.   - Patients who respond to medications but then lose their effect often do very well with VNS, which is something to consider once we exhaust less invasive options.    -- Referrals: rTMS       Care Team   Outpatient Prescriber: Outpatient Therapist: NEIL Psychiatrist: NEIL Therapist: LISET completed by: Pharmacist Consult:    Rick Sanon  N/a  Janette Armando 8/30/23    Formulation (primary and secondary factors guiding treatment plan):   Chief concern: low mood , chronic suicidality   Primary diagnosis: major depressive disorder   Secondary diagnoses/factors: ASD, trauma    Treatment plan (What are the next 1-3 steps in treatment?)   Elements to consider:  Procedures (ECT, TMS, VNS): TMS   If TMS: Which coil? First available     Medications (ketamine, MAOI): no (but may do ketamine if TMS does not work)      Psychotherapy (NICHOLE  PE, CPT, DBT):   Are we recommending BA concurrent to a procedure/medication? No   Are we recommending another form of therapy?  No     Are we recommending concurrent/combination treatments?  No     How is waitlist affecting plan? It shouldn't affect plans      What ancillary supports/resources/therapies need to be organized by our team?   Does pt need an outpatient psychiatric prescriber?  No   Does pt need to establish with a therapist? No   Are we recommending a therapy not provided by our program (e.g. DBT, PE, CPT)?   No      Clinical Global Impression - Severity (at DA) / Improvement (at FU)   Considering your total clinical experience with this particular population, how severely ill is the patient at this time?  Score (1-7): 5   What is the plan and rough timeframe for completing care with TRD Program?   What is the primary endpoint/goal of treatment?  Improvement of suicidality    Timeframe for completion of this episode of TRD care? ~ 4-6months     Who will continue outpatient medication management?   Rick Griffith   Has this been communicated to pt?  Yes   Does a care coordination call with outpatient provider(s) need to be scheduled?      No   Will the pt need ongoing psychotherapy?    Yes  Will there be ongoing follow-up for ketamine?    No   Has lab monitoring been ordered? Not needed     Next appointment: pending TMS initiation process            CRISIS NUMBERS:   Provided routinely in AVS.    Treatment Risk Statement:  The patient understands the risks, benefits, adverse effects and alternatives. Agrees to treatment with the capacity to do so. No medical contraindications to treatment. Agrees to call clinic for any problems. The patient understands to call 911 or go to the nearest ED if life threatening or urgent symptoms occur.          PROVIDER:  Kamilah Mckoy MD, PGY-2     Patient was seen and staffed with Dr. Lozano and Dr. Sanon.     Time based billing.  Time on day of service  includes:  60min spent face to face with the patient   0 minutes pre-reviewing records  20 minutes preparing consultation note and follow up messages/documentation    Physician Attestation   I, Negro Sanon MD, saw this patient and agree with the findings and plan of care as documented in the note.      Items personally reviewed/procedural attestation: I was present for and supervised the entire  procedure.    Negro Sanon MD

## 2023-09-28 ENCOUNTER — RX ONLY (RX ONLY)
Age: 53
End: 2023-09-28

## 2023-09-28 ENCOUNTER — APPOINTMENT (OUTPATIENT)
Dept: URBAN - METROPOLITAN AREA CLINIC 253 | Age: 53
Setting detail: DERMATOLOGY
End: 2023-09-28

## 2023-09-28 DIAGNOSIS — L57.0 ACTINIC KERATOSIS: ICD-10-CM

## 2023-09-28 DIAGNOSIS — L72.0 EPIDERMAL CYST: ICD-10-CM

## 2023-09-28 DIAGNOSIS — Z87.2 PERSONAL HISTORY OF DISEASES OF THE SKIN AND SUBCUTANEOUS TISSUE: ICD-10-CM

## 2023-09-28 DIAGNOSIS — L70.8 OTHER ACNE: ICD-10-CM

## 2023-09-28 DIAGNOSIS — B35.3 TINEA PEDIS: ICD-10-CM

## 2023-09-28 PROCEDURE — OTHER COUNSELING: OTHER

## 2023-09-28 PROCEDURE — 99213 OFFICE O/P EST LOW 20 MIN: CPT

## 2023-09-28 PROCEDURE — OTHER ADDITIONAL NOTES: OTHER

## 2023-09-28 PROCEDURE — OTHER MIPS QUALITY: OTHER

## 2023-09-28 PROCEDURE — OTHER PRESCRIPTION: OTHER

## 2023-09-28 RX ORDER — KETOCONAZOLE 20 MG/G
CREAM TOPICAL BID
Qty: 60 | Refills: 3 | Status: ERX | COMMUNITY
Start: 2023-09-28

## 2023-09-28 RX ORDER — UREA 20 %
20% CREAM (GRAM) TOPICAL BID
Qty: 85 | Refills: 1 | Status: ERX | COMMUNITY
Start: 2023-09-28

## 2023-09-28 ASSESSMENT — LOCATION DETAILED DESCRIPTION DERM
LOCATION DETAILED: RIGHT MEDIAL INFERIOR CHEST
LOCATION DETAILED: LEFT PLANTAR FOREFOOT OVERLYING 1ST METATARSAL
LOCATION DETAILED: LEFT MEDIAL SUPERIOR CHEST
LOCATION DETAILED: SUPERIOR THORACIC SPINE
LOCATION DETAILED: RIGHT CENTRAL EYEBROW

## 2023-09-28 ASSESSMENT — LOCATION SIMPLE DESCRIPTION DERM
LOCATION SIMPLE: LEFT PLANTAR SURFACE
LOCATION SIMPLE: UPPER BACK
LOCATION SIMPLE: RIGHT EYEBROW
LOCATION SIMPLE: CHEST

## 2023-09-28 ASSESSMENT — LOCATION ZONE DERM
LOCATION ZONE: FACE
LOCATION ZONE: FEET
LOCATION ZONE: TRUNK

## 2023-09-28 NOTE — PROCEDURE: ADDITIONAL NOTES
Render Risk Assessment In Note?: no
Detail Level: Simple
Additional Notes: Discussed that lesion does not need to be removed but an excision can be done if wanted.
Additional Notes: No reoccurrence. Healed
Additional Notes: Extracted
Additional Notes: No reoccurrence.

## 2023-10-18 ENCOUNTER — TELEPHONE (OUTPATIENT)
Dept: PSYCHIATRY | Facility: CLINIC | Age: 53
End: 2023-10-18

## 2023-10-23 ENCOUNTER — TELEPHONE (OUTPATIENT)
Dept: PSYCHIATRY | Facility: CLINIC | Age: 53
End: 2023-10-23

## 2024-02-08 ENCOUNTER — APPOINTMENT (OUTPATIENT)
Dept: URBAN - METROPOLITAN AREA CLINIC 253 | Age: 54
Setting detail: DERMATOLOGY
End: 2024-02-08

## 2024-02-08 VITALS — HEIGHT: 70 IN | RESPIRATION RATE: 14 BRPM | WEIGHT: 280 LBS

## 2024-02-08 DIAGNOSIS — L90.5 SCAR CONDITIONS AND FIBROSIS OF SKIN: ICD-10-CM

## 2024-02-08 DIAGNOSIS — Q828 OTHER SPECIFIED ANOMALIES OF SKIN: ICD-10-CM

## 2024-02-08 DIAGNOSIS — Q819 OTHER SPECIFIED ANOMALIES OF SKIN: ICD-10-CM

## 2024-02-08 DIAGNOSIS — Q826 OTHER SPECIFIED ANOMALIES OF SKIN: ICD-10-CM

## 2024-02-08 DIAGNOSIS — I87.2 VENOUS INSUFFICIENCY (CHRONIC) (PERIPHERAL): ICD-10-CM

## 2024-02-08 PROBLEM — L85.8 OTHER SPECIFIED EPIDERMAL THICKENING: Status: ACTIVE | Noted: 2024-02-08

## 2024-02-08 PROCEDURE — OTHER COUNSELING: OTHER

## 2024-02-08 PROCEDURE — OTHER MIPS QUALITY: OTHER

## 2024-02-08 PROCEDURE — OTHER ADDITIONAL NOTES: OTHER

## 2024-02-08 PROCEDURE — OTHER PRESCRIPTION: OTHER

## 2024-02-08 PROCEDURE — 99213 OFFICE O/P EST LOW 20 MIN: CPT

## 2024-02-08 RX ORDER — TRIAMCINOLONE ACETONIDE 1 MG/G
CREAM TOPICAL BID
Qty: 80 | Refills: 1 | Status: ERX

## 2024-02-08 ASSESSMENT — LOCATION DETAILED DESCRIPTION DERM
LOCATION DETAILED: LEFT KNEE
LOCATION DETAILED: LEFT DISTAL PRETIBIAL REGION
LOCATION DETAILED: RIGHT ANTERIOR PROXIMAL UPPER ARM
LOCATION DETAILED: LEFT ANTERIOR PROXIMAL UPPER ARM

## 2024-02-08 ASSESSMENT — LOCATION SIMPLE DESCRIPTION DERM
LOCATION SIMPLE: LEFT KNEE
LOCATION SIMPLE: LEFT UPPER ARM
LOCATION SIMPLE: LEFT PRETIBIAL REGION
LOCATION SIMPLE: RIGHT UPPER ARM

## 2024-02-08 ASSESSMENT — LOCATION ZONE DERM
LOCATION ZONE: LEG
LOCATION ZONE: ARM

## 2024-02-08 NOTE — PROCEDURE: ADDITIONAL NOTES
Detail Level: Zone
Additional Notes: Discussed this condition being second to the knee operation and the bakers cyst rupturing. \\nDiscussed that the swelling has caused inflammation, impacting the circulation. Recommend applying moisturizing cream daily.
Render Risk Assessment In Note?: no
Additional Notes: Recommend purchasing Vitamin e oil capsules and breaking the capsule open to massage the oil into the scar area.\\nAdvised patient to use sun protection to avoid further darkening of the scar.
Additional Notes: Recommend that he use Eucerin Advanced Repair lotion daily after showering.

## 2024-02-08 NOTE — HPI: RASH
Is This A New Presentation, Or A Follow-Up?: Rash
Additional History: Patient states the site where he had knee surgery (12/27/23) is burning and itching. He also states there is a rash that feels and looks weird. He had used a sleeve and has noticed that change in the past month. He also has little red spots throughout his body, but specifically his right upper thigh. He states they are all present where there is clothing. He states his arms, back, buttocks, and thighs. They are not sensitive or itchy.

## 2024-03-20 ENCOUNTER — APPOINTMENT (OUTPATIENT)
Dept: URBAN - METROPOLITAN AREA CLINIC 253 | Age: 54
Setting detail: DERMATOLOGY
End: 2024-03-20

## 2024-03-20 VITALS — WEIGHT: 282 LBS | HEIGHT: 70 IN | RESPIRATION RATE: 14 BRPM

## 2024-03-20 DIAGNOSIS — L91.0 HYPERTROPHIC SCAR: ICD-10-CM

## 2024-03-20 DIAGNOSIS — L24.9 IRRITANT CONTACT DERMATITIS, UNSPECIFIED CAUSE: ICD-10-CM

## 2024-03-20 DIAGNOSIS — L70.0 ACNE VULGARIS: ICD-10-CM

## 2024-03-20 PROCEDURE — OTHER ADDITIONAL NOTES: OTHER

## 2024-03-20 PROCEDURE — 99213 OFFICE O/P EST LOW 20 MIN: CPT

## 2024-03-20 PROCEDURE — OTHER COUNSELING: OTHER

## 2024-03-20 PROCEDURE — OTHER PRESCRIPTION: OTHER

## 2024-03-20 PROCEDURE — OTHER MIPS QUALITY: OTHER

## 2024-03-20 RX ORDER — CLINDAMYCIN PHOSPHATE 10 MG/ML
1% LOTION TOPICAL QAM
Qty: 60 | Refills: 3 | Status: ERX | COMMUNITY
Start: 2024-03-20

## 2024-03-20 ASSESSMENT — LOCATION DETAILED DESCRIPTION DERM
LOCATION DETAILED: LEFT ANTERIOR DISTAL UPPER ARM
LOCATION DETAILED: LEFT KNEE
LOCATION DETAILED: RIGHT ANTERIOR DISTAL UPPER ARM
LOCATION DETAILED: STERNUM

## 2024-03-20 ASSESSMENT — LOCATION SIMPLE DESCRIPTION DERM
LOCATION SIMPLE: CHEST
LOCATION SIMPLE: RIGHT UPPER ARM
LOCATION SIMPLE: LEFT KNEE
LOCATION SIMPLE: LEFT UPPER ARM

## 2024-03-20 ASSESSMENT — LOCATION ZONE DERM
LOCATION ZONE: ARM
LOCATION ZONE: TRUNK
LOCATION ZONE: LEG

## 2024-03-20 NOTE — HPI: SCAR
How Severe Is Your Scar?: moderate
Is This A New Presentation, Or A Follow-Up?: Scar
Additional History: He had a knee replacement in December.

## 2024-03-20 NOTE — HPI: RASH
How Severe Is Your Rash?: moderate
Is This A New Presentation, Or A Follow-Up?: Rash
Additional History: He has been walking 1-2 miles outside daily. When it flares, he experiences itching and burning.  It is more prevalent on his right arm than his left.

## 2024-03-20 NOTE — PROCEDURE: ADDITIONAL NOTES
Additional Notes: He has triamcinolone at home and will use this on the rash on his arms BID for up to 2 weeks, then take a 2 week break. He will call if he needs refills.
Detail Level: Zone
Render Risk Assessment In Note?: no
Additional Notes: He has started vitamin E oil massage. This still feels tight and tender. If not improved after 3 months of vitamin E we will consider ILK.

## 2024-03-20 NOTE — PROCEDURE: COUNSELING
Detail Level: Zone
Birth Control Pills Counseling: Birth Control Pill Counseling: I discussed with the patient the potential side effects of OCPs including but not limited to increased risk of stroke, heart attack, thrombophlebitis, deep venous thrombosis, hepatic adenomas, breast changes, GI upset, headaches, and depression.  The patient verbalized understanding of the proper use and possible adverse effects of OCPs. All of the patient's questions and concerns were addressed.
Doxycycline Pregnancy And Lactation Text: This medication is Pregnancy Category D and not consider safe during pregnancy. It is also excreted in breast milk but is considered safe for shorter treatment courses.
Sarecycline Counseling: Patient advised regarding possible photosensitivity and discoloration of the teeth, skin, lips, tongue and gums.  Patient instructed to avoid sunlight, if possible.  When exposed to sunlight, patients should wear protective clothing, sunglasses, and sunscreen.  The patient was instructed to call the office immediately if the following severe adverse effects occur:  hearing changes, easy bruising/bleeding, severe headache, or vision changes.  The patient verbalized understanding of the proper use and possible adverse effects of sarecycline.  All of the patient's questions and concerns were addressed.
Aklief Pregnancy And Lactation Text: It is unknown if this medication is safe to use during pregnancy.  It is unknown if this medication is excreted in breast milk.  Breastfeeding women should use the topical cream on the smallest area of the skin for the shortest time needed while breastfeeding.  Do not apply to nipple and areola.
Spironolactone Counseling: Patient advised regarding risks of diarrhea, abdominal pain, hyperkalemia, birth defects (for female patients), liver toxicity and renal toxicity. The patient may need blood work to monitor liver and kidney function and potassium levels while on therapy. The patient verbalized understanding of the proper use and possible adverse effects of spironolactone.  All of the patient's questions and concerns were addressed.
Use Enhanced Medication Counseling?: No
Bactrim Counseling:  I discussed with the patient the risks of sulfa antibiotics including but not limited to GI upset, allergic reaction, drug rash, diarrhea, dizziness, photosensitivity, and yeast infections.  Rarely, more serious reactions can occur including but not limited to aplastic anemia, agranulocytosis, methemoglobinemia, blood dyscrasias, liver or kidney failure, lung infiltrates or desquamative/blistering drug rashes.
Erythromycin Pregnancy And Lactation Text: This medication is Pregnancy Category B and is considered safe during pregnancy. It is also excreted in breast milk.
Azelaic Acid Pregnancy And Lactation Text: This medication is considered safe during pregnancy and breast feeding.
Topical Clindamycin Counseling: Patient counseled that this medication may cause skin irritation or allergic reactions.  In the event of skin irritation, the patient was advised to reduce the amount of the drug applied or use it less frequently.   The patient verbalized understanding of the proper use and possible adverse effects of clindamycin.  All of the patient's questions and concerns were addressed.
Azithromycin Counseling:  I discussed with the patient the risks of azithromycin including but not limited to GI upset, allergic reaction, drug rash, diarrhea, and yeast infections.
Dapsone Pregnancy And Lactation Text: This medication is Pregnancy Category C and is not considered safe during pregnancy or breast feeding.
Spironolactone Pregnancy And Lactation Text: This medication can cause feminization of the male fetus and should be avoided during pregnancy. The active metabolite is also found in breast milk.
Minocycline Counseling: Patient advised regarding possible photosensitivity and discoloration of the teeth, skin, lips, tongue and gums.  Patient instructed to avoid sunlight, if possible.  When exposed to sunlight, patients should wear protective clothing, sunglasses, and sunscreen.  The patient was instructed to call the office immediately if the following severe adverse effects occur:  hearing changes, easy bruising/bleeding, severe headache, or vision changes.  The patient verbalized understanding of the proper use and possible adverse effects of minocycline.  All of the patient's questions and concerns were addressed.
Winlevi Pregnancy And Lactation Text: This medication is considered safe during pregnancy and breastfeeding.
Tazorac Counseling:  Patient advised that medication is irritating and drying.  Patient may need to apply sparingly and wash off after an hour before eventually leaving it on overnight.  The patient verbalized understanding of the proper use and possible adverse effects of tazorac.  All of the patient's questions and concerns were addressed.
High Dose Vitamin A Counseling: Side effects reviewed, pt to contact office should one occur.
Topical Sulfur Applications Pregnancy And Lactation Text: This medication is Pregnancy Category C and has an unknown safety profile during pregnancy. It is unknown if this topical medication is excreted in breast milk.
Topical Retinoid counseling:  Patient advised to apply a pea-sized amount only at bedtime and wait 30 minutes after washing their face before applying.  If too drying, patient may add a non-comedogenic moisturizer. The patient verbalized understanding of the proper use and possible adverse effects of retinoids.  All of the patient's questions and concerns were addressed.
Tetracycline Pregnancy And Lactation Text: This medication is Pregnancy Category D and not consider safe during pregnancy. It is also excreted in breast milk.
Erythromycin Counseling:  I discussed with the patient the risks of erythromycin including but not limited to GI upset, allergic reaction, drug rash, diarrhea, increase in liver enzymes, and yeast infections.
Tazorac Pregnancy And Lactation Text: This medication is not safe during pregnancy. It is unknown if this medication is excreted in breast milk.
Azelaic Acid Counseling: Patient counseled that medicine may cause skin irritation and to avoid applying near the eyes.  In the event of skin irritation, the patient was advised to reduce the amount of the drug applied or use it less frequently.   The patient verbalized understanding of the proper use and possible adverse effects of azelaic acid.  All of the patient's questions and concerns were addressed.
Birth Control Pills Pregnancy And Lactation Text: This medication should be avoided if pregnant and for the first 30 days post-partum.
Isotretinoin Counseling: Patient should get monthly blood tests, not donate blood, not drive at night if vision affected, not share medication, and not undergo elective surgery for 6 months after tx completed. Side effects reviewed, pt to contact office should one occur.
Topical Clindamycin Pregnancy And Lactation Text: This medication is Pregnancy Category B and is considered safe during pregnancy. It is unknown if it is excreted in breast milk.
Benzoyl Peroxide Counseling: Patient counseled that medicine may cause skin irritation and bleach clothing.  In the event of skin irritation, the patient was advised to reduce the amount of the drug applied or use it less frequently.   The patient verbalized understanding of the proper use and possible adverse effects of benzoyl peroxide.  All of the patient's questions and concerns were addressed.
Bactrim Pregnancy And Lactation Text: This medication is Pregnancy Category D and is known to cause fetal risk.  It is also excreted in breast milk.
Isotretinoin Pregnancy And Lactation Text: This medication is Pregnancy Category X and is considered extremely dangerous during pregnancy. It is unknown if it is excreted in breast milk.
Benzoyl Peroxide Pregnancy And Lactation Text: This medication is Pregnancy Category C. It is unknown if benzoyl peroxide is excreted in breast milk.
Topical Sulfur Applications Counseling: Topical Sulfur Counseling: Patient counseled that this medication may cause skin irritation or allergic reactions.  In the event of skin irritation, the patient was advised to reduce the amount of the drug applied or use it less frequently.   The patient verbalized understanding of the proper use and possible adverse effects of topical sulfur application.  All of the patient's questions and concerns were addressed.
Tetracycline Counseling: Patient counseled regarding possible photosensitivity and increased risk for sunburn.  Patient instructed to avoid sunlight, if possible.  When exposed to sunlight, patients should wear protective clothing, sunglasses, and sunscreen.  The patient was instructed to call the office immediately if the following severe adverse effects occur:  hearing changes, easy bruising/bleeding, severe headache, or vision changes.  The patient verbalized understanding of the proper use and possible adverse effects of tetracycline.  All of the patient's questions and concerns were addressed. Patient understands to avoid pregnancy while on therapy due to potential birth defects.
Azithromycin Pregnancy And Lactation Text: This medication is considered safe during pregnancy and is also secreted in breast milk.
Doxycycline Counseling:  Patient counseled regarding possible photosensitivity and increased risk for sunburn.  Patient instructed to avoid sunlight, if possible.  When exposed to sunlight, patients should wear protective clothing, sunglasses, and sunscreen.  The patient was instructed to call the office immediately if the following severe adverse effects occur:  hearing changes, easy bruising/bleeding, severe headache, or vision changes.  The patient verbalized understanding of the proper use and possible adverse effects of doxycycline.  All of the patient's questions and concerns were addressed.
Winlevi Counseling:  I discussed with the patient the risks of topical clascoterone including but not limited to erythema, scaling, itching, and stinging. Patient voiced their understanding.
Aklief counseling:  Patient advised to apply a pea-sized amount only at bedtime and wait 30 minutes after washing their face before applying.  If too drying, patient may add a non-comedogenic moisturizer.  The most commonly reported side effects including irritation, redness, scaling, dryness, stinging, burning, itching, and increased risk of sunburn.  The patient verbalized understanding of the proper use and possible adverse effects of retinoids.  All of the patient's questions and concerns were addressed.
Topical Retinoid Pregnancy And Lactation Text: This medication is Pregnancy Category C. It is unknown if this medication is excreted in breast milk.
Dapsone Counseling: I discussed with the patient the risks of dapsone including but not limited to hemolytic anemia, agranulocytosis, rashes, methemoglobinemia, kidney failure, peripheral neuropathy, headaches, GI upset, and liver toxicity.  Patients who start dapsone require monitoring including baseline LFTs and weekly CBCs for the first month, then every month thereafter.  The patient verbalized understanding of the proper use and possible adverse effects of dapsone.  All of the patient's questions and concerns were addressed.
High Dose Vitamin A Pregnancy And Lactation Text: High dose vitamin A therapy is contraindicated during pregnancy and breast feeding.
Detail Level: Detailed

## 2024-06-02 ENCOUNTER — HEALTH MAINTENANCE LETTER (OUTPATIENT)
Age: 54
End: 2024-06-02

## 2024-10-29 ENCOUNTER — APPOINTMENT (OUTPATIENT)
Dept: URBAN - METROPOLITAN AREA CLINIC 253 | Age: 54
Setting detail: DERMATOLOGY
End: 2024-10-29

## 2024-10-29 VITALS — HEIGHT: 70 IN | WEIGHT: 275 LBS | RESPIRATION RATE: 14 BRPM

## 2024-10-29 DIAGNOSIS — L30.9 DERMATITIS, UNSPECIFIED: ICD-10-CM

## 2024-10-29 PROCEDURE — OTHER COUNSELING: OTHER

## 2024-10-29 PROCEDURE — OTHER PRESCRIPTION: OTHER

## 2024-10-29 PROCEDURE — 99213 OFFICE O/P EST LOW 20 MIN: CPT

## 2024-10-29 PROCEDURE — OTHER OTC TREATMENT REGIMEN: OTHER

## 2024-10-29 PROCEDURE — OTHER ADDITIONAL NOTES: OTHER

## 2024-10-29 PROCEDURE — OTHER MIPS QUALITY: OTHER

## 2024-10-29 RX ORDER — TRIAMCINOLONE ACETONIDE 1 MG/G
OINTMENT TOPICAL
Qty: 453.6 | Refills: 0 | Status: ERX | COMMUNITY
Start: 2024-10-29

## 2024-10-29 ASSESSMENT — LOCATION DETAILED DESCRIPTION DERM
LOCATION DETAILED: RIGHT DISTAL PRETIBIAL REGION
LOCATION DETAILED: LEFT DISTAL PRETIBIAL REGION
LOCATION DETAILED: LEFT RADIAL PALM
LOCATION DETAILED: RIGHT RADIAL PALM

## 2024-10-29 ASSESSMENT — LOCATION SIMPLE DESCRIPTION DERM
LOCATION SIMPLE: RIGHT HAND
LOCATION SIMPLE: LEFT HAND
LOCATION SIMPLE: LEFT PRETIBIAL REGION
LOCATION SIMPLE: RIGHT PRETIBIAL REGION

## 2024-10-29 ASSESSMENT — LOCATION ZONE DERM
LOCATION ZONE: LEG
LOCATION ZONE: HAND

## 2024-10-29 NOTE — PROCEDURE: ADDITIONAL NOTES
Detail Level: Simple
Render Risk Assessment In Note?: no
Additional Notes: His skin appears normal on examination. Discussed the option of trying a topical steroid due to some dryness on his palms.\\nDiscussed the concern for this sensation to be related to his endocrine system due to his history of hypothyroidism. He has an upcoming appointment with his endocrinologist.\\n

## 2024-10-29 NOTE — PROCEDURE: OTC TREATMENT REGIMEN
Patient Specific Otc Recommendations (Will Not Stick From Patient To Patient): Discussed OTC antihistamines\\nPt may use prescription Triamcinolone when flared.
Detail Level: Zone

## 2024-10-29 NOTE — HPI: RASH
Is This A New Presentation, Or A Follow-Up?: Rash
Additional History: He reports his palms, face, shins, any skin exposed feels like chalk dust or wax on his skin. He has tried numerous soaps to wash his hands and multiple lotions including Vaseline intensive care and Aveeno. This has been going on for the past week and a half.

## 2025-05-21 ENCOUNTER — APPOINTMENT (OUTPATIENT)
Dept: URBAN - METROPOLITAN AREA CLINIC 253 | Age: 55
Setting detail: DERMATOLOGY
End: 2025-05-21

## 2025-05-21 VITALS — RESPIRATION RATE: 14 BRPM | WEIGHT: 275 LBS | HEIGHT: 70 IN

## 2025-05-21 DIAGNOSIS — L20.89 OTHER ATOPIC DERMATITIS: ICD-10-CM

## 2025-05-21 DIAGNOSIS — R20.2 PARESTHESIA OF SKIN: ICD-10-CM

## 2025-05-21 DIAGNOSIS — L81.4 OTHER MELANIN HYPERPIGMENTATION: ICD-10-CM

## 2025-05-21 DIAGNOSIS — D22 MELANOCYTIC NEVI: ICD-10-CM

## 2025-05-21 DIAGNOSIS — Z71.89 OTHER SPECIFIED COUNSELING: ICD-10-CM

## 2025-05-21 DIAGNOSIS — L21.8 OTHER SEBORRHEIC DERMATITIS: ICD-10-CM

## 2025-05-21 DIAGNOSIS — D18.0 HEMANGIOMA: ICD-10-CM

## 2025-05-21 DIAGNOSIS — L82.1 OTHER SEBORRHEIC KERATOSIS: ICD-10-CM

## 2025-05-21 PROBLEM — D18.01 HEMANGIOMA OF SKIN AND SUBCUTANEOUS TISSUE: Status: ACTIVE | Noted: 2025-05-21

## 2025-05-21 PROBLEM — D22.5 MELANOCYTIC NEVI OF TRUNK: Status: ACTIVE | Noted: 2025-05-21

## 2025-05-21 PROCEDURE — OTHER COUNSELING: OTHER

## 2025-05-21 PROCEDURE — 99213 OFFICE O/P EST LOW 20 MIN: CPT

## 2025-05-21 PROCEDURE — OTHER MIPS QUALITY: OTHER

## 2025-05-21 ASSESSMENT — LOCATION ZONE DERM
LOCATION ZONE: FACE
LOCATION ZONE: LEG
LOCATION ZONE: EAR
LOCATION ZONE: ARM
LOCATION ZONE: TRUNK
LOCATION ZONE: TRUNK

## 2025-05-21 ASSESSMENT — LOCATION DETAILED DESCRIPTION DERM
LOCATION DETAILED: LEFT CENTRAL MALAR CHEEK
LOCATION DETAILED: LEFT SCAPHA
LOCATION DETAILED: LEFT ANTERIOR PROXIMAL UPPER ARM
LOCATION DETAILED: INFERIOR THORACIC SPINE
LOCATION DETAILED: SUPERIOR LUMBAR SPINE
LOCATION DETAILED: RIGHT SCAPHA
LOCATION DETAILED: SUPERIOR THORACIC SPINE
LOCATION DETAILED: SUPERIOR THORACIC SPINE
LOCATION DETAILED: LEFT ANTERIOR DISTAL THIGH

## 2025-05-21 ASSESSMENT — LOCATION SIMPLE DESCRIPTION DERM
LOCATION SIMPLE: LEFT UPPER ARM
LOCATION SIMPLE: LOWER BACK
LOCATION SIMPLE: LEFT CHEEK
LOCATION SIMPLE: LEFT EAR
LOCATION SIMPLE: RIGHT EAR
LOCATION SIMPLE: UPPER BACK
LOCATION SIMPLE: UPPER BACK
LOCATION SIMPLE: LEFT THIGH

## 2025-06-15 ENCOUNTER — HEALTH MAINTENANCE LETTER (OUTPATIENT)
Age: 55
End: 2025-06-15